# Patient Record
Sex: MALE | Race: BLACK OR AFRICAN AMERICAN | Employment: UNEMPLOYED | URBAN - METROPOLITAN AREA
[De-identification: names, ages, dates, MRNs, and addresses within clinical notes are randomized per-mention and may not be internally consistent; named-entity substitution may affect disease eponyms.]

---

## 2019-01-26 ENCOUNTER — HOSPITAL ENCOUNTER (INPATIENT)
Age: 44
LOS: 6 days | Discharge: HOME | DRG: 174 | End: 2019-02-01
Attending: EMERGENCY MEDICINE | Admitting: INTERNAL MEDICINE
Payer: MEDICAID

## 2019-01-26 ENCOUNTER — HOSPITAL ENCOUNTER (EMERGENCY)
Age: 44
Discharge: OTHER INSTITUTION | End: 2019-01-26
Attending: EMERGENCY MEDICINE
Payer: MEDICAID

## 2019-01-26 VITALS
RESPIRATION RATE: 16 BRPM | TEMPERATURE: 98.6 F | HEIGHT: 74 IN | BODY MASS INDEX: 37.28 KG/M2 | DIASTOLIC BLOOD PRESSURE: 133 MMHG | SYSTOLIC BLOOD PRESSURE: 193 MMHG | OXYGEN SATURATION: 100 % | WEIGHT: 290.5 LBS | HEART RATE: 103 BPM

## 2019-01-26 DIAGNOSIS — I50.9 CHF (CONGESTIVE HEART FAILURE) (HCC): ICD-10-CM

## 2019-01-26 DIAGNOSIS — I25.10 CAD (CORONARY ARTERY DISEASE): ICD-10-CM

## 2019-01-26 DIAGNOSIS — I21.9 MYOCARDIAL INFARCTION (HCC): ICD-10-CM

## 2019-01-26 DIAGNOSIS — I21.19 ACUTE ST ELEVATION MYOCARDIAL INFARCTION (STEMI) OF INFERIOR WALL (HCC): Primary | ICD-10-CM

## 2019-01-26 DIAGNOSIS — I21.19 ACUTE ST ELEVATION MYOCARDIAL INFARCTION (STEMI) INVOLVING OTHER CORONARY ARTERY OF INFERIOR WALL (HCC): Primary | ICD-10-CM

## 2019-01-26 LAB
ALBUMIN SERPL-MCNC: 3.4 G/DL (ref 3.5–5)
ALBUMIN/GLOB SERPL: 0.8 {RATIO} (ref 1.1–2.2)
ALP SERPL-CCNC: 77 U/L (ref 45–117)
ALT SERPL-CCNC: 29 U/L (ref 12–78)
ANION GAP SERPL CALC-SCNC: 9 MMOL/L (ref 5–15)
AST SERPL-CCNC: 77 U/L (ref 15–37)
BASOPHILS # BLD: 0 K/UL (ref 0–0.1)
BASOPHILS NFR BLD: 1 % (ref 0–1)
BILIRUB SERPL-MCNC: 0.3 MG/DL (ref 0.2–1)
BUN SERPL-MCNC: 12 MG/DL (ref 6–20)
BUN/CREAT SERPL: 10 (ref 12–20)
CALCIUM SERPL-MCNC: 8.7 MG/DL (ref 8.5–10.1)
CHLORIDE SERPL-SCNC: 101 MMOL/L (ref 97–108)
CK MB CFR SERPL CALC: 5.4 % (ref 0–2.5)
CK MB SERPL-MCNC: 25.5 NG/ML (ref 5–25)
CK SERPL-CCNC: 471 U/L (ref 39–308)
CO2 SERPL-SCNC: 31 MMOL/L (ref 21–32)
CREAT SERPL-MCNC: 1.17 MG/DL (ref 0.7–1.3)
DIFFERENTIAL METHOD BLD: ABNORMAL
EOSINOPHIL # BLD: 0.1 K/UL (ref 0–0.4)
EOSINOPHIL NFR BLD: 2 % (ref 0–7)
ERYTHROCYTE [DISTWIDTH] IN BLOOD BY AUTOMATED COUNT: 13.6 % (ref 11.5–14.5)
GLOBULIN SER CALC-MCNC: 4.5 G/DL (ref 2–4)
GLUCOSE SERPL-MCNC: 114 MG/DL (ref 65–100)
HCT VFR BLD AUTO: 39.9 % (ref 36.6–50.3)
HGB BLD-MCNC: 12.5 G/DL (ref 12.1–17)
IMM GRANULOCYTES # BLD AUTO: 0 K/UL (ref 0–0.04)
IMM GRANULOCYTES NFR BLD AUTO: 0 % (ref 0–0.5)
LYMPHOCYTES # BLD: 2.3 K/UL (ref 0.8–3.5)
LYMPHOCYTES NFR BLD: 26 % (ref 12–49)
MCH RBC QN AUTO: 25.8 PG (ref 26–34)
MCHC RBC AUTO-ENTMCNC: 31.3 G/DL (ref 30–36.5)
MCV RBC AUTO: 82.3 FL (ref 80–99)
MONOCYTES # BLD: 0.6 K/UL (ref 0–1)
MONOCYTES NFR BLD: 6 % (ref 5–13)
NEUTS SEG # BLD: 5.7 K/UL (ref 1.8–8)
NEUTS SEG NFR BLD: 65 % (ref 32–75)
NRBC # BLD: 0 K/UL (ref 0–0.01)
NRBC BLD-RTO: 0 PER 100 WBC
PLATELET # BLD AUTO: 236 K/UL (ref 150–400)
PMV BLD AUTO: 9.6 FL (ref 8.9–12.9)
POTASSIUM SERPL-SCNC: 3.3 MMOL/L (ref 3.5–5.1)
PROT SERPL-MCNC: 7.9 G/DL (ref 6.4–8.2)
RBC # BLD AUTO: 4.85 M/UL (ref 4.1–5.7)
SODIUM SERPL-SCNC: 141 MMOL/L (ref 136–145)
TROPONIN I BLD-MCNC: 4.09 NG/ML (ref 0–0.08)
TROPONIN I SERPL-MCNC: 8.73 NG/ML
WBC # BLD AUTO: 8.8 K/UL (ref 4.1–11.1)

## 2019-01-26 PROCEDURE — 4A023N7 MEASUREMENT OF CARDIAC SAMPLING AND PRESSURE, LEFT HEART, PERCUTANEOUS APPROACH: ICD-10-PCS | Performed by: INTERNAL MEDICINE

## 2019-01-26 PROCEDURE — C1725 CATH, TRANSLUMIN NON-LASER: HCPCS

## 2019-01-26 PROCEDURE — 85025 COMPLETE CBC W/AUTO DIFF WBC: CPT

## 2019-01-26 PROCEDURE — C1894 INTRO/SHEATH, NON-LASER: HCPCS

## 2019-01-26 PROCEDURE — 80053 COMPREHEN METABOLIC PANEL: CPT

## 2019-01-26 PROCEDURE — 74011000258 HC RX REV CODE- 258: Performed by: INTERNAL MEDICINE

## 2019-01-26 PROCEDURE — 36415 COLL VENOUS BLD VENIPUNCTURE: CPT

## 2019-01-26 PROCEDURE — 74011636320 HC RX REV CODE- 636/320

## 2019-01-26 PROCEDURE — 74011250636 HC RX REV CODE- 250/636: Performed by: NURSE PRACTITIONER

## 2019-01-26 PROCEDURE — C1760 CLOSURE DEV, VASC: HCPCS

## 2019-01-26 PROCEDURE — 74011000250 HC RX REV CODE- 250

## 2019-01-26 PROCEDURE — C1769 GUIDE WIRE: HCPCS

## 2019-01-26 PROCEDURE — 93005 ELECTROCARDIOGRAM TRACING: CPT

## 2019-01-26 PROCEDURE — 65660000000 HC RM CCU STEPDOWN

## 2019-01-26 PROCEDURE — 02703ZZ DILATION OF CORONARY ARTERY, ONE ARTERY, PERCUTANEOUS APPROACH: ICD-10-PCS | Performed by: INTERNAL MEDICINE

## 2019-01-26 PROCEDURE — 99283 EMERGENCY DEPT VISIT LOW MDM: CPT

## 2019-01-26 PROCEDURE — C1887 CATHETER, GUIDING: HCPCS

## 2019-01-26 PROCEDURE — C1874 STENT, COATED/COV W/DEL SYS: HCPCS

## 2019-01-26 PROCEDURE — 74011250637 HC RX REV CODE- 250/637: Performed by: INTERNAL MEDICINE

## 2019-01-26 PROCEDURE — 74011250637 HC RX REV CODE- 250/637: Performed by: EMERGENCY MEDICINE

## 2019-01-26 PROCEDURE — 77030003623 HC NDL PERC VASC TELE -C

## 2019-01-26 PROCEDURE — B2111ZZ FLUOROSCOPY OF MULTIPLE CORONARY ARTERIES USING LOW OSMOLAR CONTRAST: ICD-10-PCS | Performed by: INTERNAL MEDICINE

## 2019-01-26 PROCEDURE — 82550 ASSAY OF CK (CPK): CPT

## 2019-01-26 PROCEDURE — B2151ZZ FLUOROSCOPY OF LEFT HEART USING LOW OSMOLAR CONTRAST: ICD-10-PCS | Performed by: INTERNAL MEDICINE

## 2019-01-26 PROCEDURE — 96374 THER/PROPH/DIAG INJ IV PUSH: CPT

## 2019-01-26 PROCEDURE — 74011636320 HC RX REV CODE- 636/320: Performed by: INTERNAL MEDICINE

## 2019-01-26 PROCEDURE — 74011250636 HC RX REV CODE- 250/636

## 2019-01-26 PROCEDURE — 84484 ASSAY OF TROPONIN QUANT: CPT

## 2019-01-26 PROCEDURE — 74011000250 HC RX REV CODE- 250: Performed by: INTERNAL MEDICINE

## 2019-01-26 PROCEDURE — 77030004550 HC CATH ANGI DX PRF MRTM -B

## 2019-01-26 PROCEDURE — 74011250636 HC RX REV CODE- 250/636: Performed by: INTERNAL MEDICINE

## 2019-01-26 PROCEDURE — 77030028837 HC SYR ANGI PWR INJ COEU -A

## 2019-01-26 PROCEDURE — 77030019697 HC SYR ANGI INFL MRTM -B

## 2019-01-26 PROCEDURE — 027034Z DILATION OF CORONARY ARTERY, ONE ARTERY WITH DRUG-ELUTING INTRALUMINAL DEVICE, PERCUTANEOUS APPROACH: ICD-10-PCS | Performed by: INTERNAL MEDICINE

## 2019-01-26 PROCEDURE — 99285 EMERGENCY DEPT VISIT HI MDM: CPT

## 2019-01-26 PROCEDURE — 74011250637 HC RX REV CODE- 250/637

## 2019-01-26 PROCEDURE — 77030022017 HC DRSG HEMO QCLOT ZMED -A

## 2019-01-26 PROCEDURE — 93458 L HRT ARTERY/VENTRICLE ANGIO: CPT

## 2019-01-26 RX ORDER — SODIUM CHLORIDE 9 MG/ML
100 INJECTION, SOLUTION INTRAVENOUS CONTINUOUS
Status: DISPENSED | OUTPATIENT
Start: 2019-01-26 | End: 2019-01-26

## 2019-01-26 RX ORDER — NITROGLYCERIN 20 MG/100ML
0-100 INJECTION INTRAVENOUS
Status: DISCONTINUED | OUTPATIENT
Start: 2019-01-26 | End: 2019-02-01 | Stop reason: HOSPADM

## 2019-01-26 RX ORDER — HEPARIN SODIUM 200 [USP'U]/100ML
500 INJECTION, SOLUTION INTRAVENOUS ONCE
Status: COMPLETED | OUTPATIENT
Start: 2019-01-26 | End: 2019-01-26

## 2019-01-26 RX ORDER — SODIUM CHLORIDE 0.9 % (FLUSH) 0.9 %
5-40 SYRINGE (ML) INJECTION AS NEEDED
Status: DISCONTINUED | OUTPATIENT
Start: 2019-01-26 | End: 2019-02-01 | Stop reason: HOSPADM

## 2019-01-26 RX ORDER — MORPHINE SULFATE 2 MG/ML
2 INJECTION, SOLUTION INTRAMUSCULAR; INTRAVENOUS
Status: DISCONTINUED | OUTPATIENT
Start: 2019-01-26 | End: 2019-01-26 | Stop reason: CLARIF

## 2019-01-26 RX ORDER — HEPARIN SODIUM 200 [USP'U]/100ML
INJECTION, SOLUTION INTRAVENOUS
Status: COMPLETED
Start: 2019-01-26 | End: 2019-01-26

## 2019-01-26 RX ORDER — ENOXAPARIN SODIUM 100 MG/ML
40 INJECTION SUBCUTANEOUS EVERY 24 HOURS
Status: DISCONTINUED | OUTPATIENT
Start: 2019-01-26 | End: 2019-02-01 | Stop reason: HOSPADM

## 2019-01-26 RX ORDER — FENTANYL CITRATE 50 UG/ML
25-50 INJECTION, SOLUTION INTRAMUSCULAR; INTRAVENOUS
Status: DISCONTINUED | OUTPATIENT
Start: 2019-01-26 | End: 2019-01-26 | Stop reason: HOSPADM

## 2019-01-26 RX ORDER — NITROGLYCERIN 0.4 MG/1
0.4 TABLET SUBLINGUAL
Status: DISCONTINUED | OUTPATIENT
Start: 2019-01-26 | End: 2019-02-01 | Stop reason: HOSPADM

## 2019-01-26 RX ORDER — SODIUM CHLORIDE 0.9 % (FLUSH) 0.9 %
5-40 SYRINGE (ML) INJECTION EVERY 8 HOURS
Status: DISCONTINUED | OUTPATIENT
Start: 2019-01-26 | End: 2019-02-01 | Stop reason: HOSPADM

## 2019-01-26 RX ORDER — SODIUM CHLORIDE 900 MG/100ML
INJECTION INTRAVENOUS
Status: DISCONTINUED
Start: 2019-01-26 | End: 2019-02-01 | Stop reason: HOSPADM

## 2019-01-26 RX ORDER — BIVALIRUDIN 250 MG/5ML
INJECTION, POWDER, LYOPHILIZED, FOR SOLUTION INTRAVENOUS
Status: DISCONTINUED
Start: 2019-01-26 | End: 2019-02-01 | Stop reason: HOSPADM

## 2019-01-26 RX ORDER — ACETAMINOPHEN 325 MG/1
650 TABLET ORAL
Status: DISCONTINUED | OUTPATIENT
Start: 2019-01-26 | End: 2019-02-01 | Stop reason: HOSPADM

## 2019-01-26 RX ORDER — HEPARIN SODIUM 1000 [USP'U]/ML
INJECTION, SOLUTION INTRAVENOUS; SUBCUTANEOUS
Status: DISCONTINUED
Start: 2019-01-26 | End: 2019-02-01 | Stop reason: HOSPADM

## 2019-01-26 RX ORDER — GUAIFENESIN 100 MG/5ML
81 LIQUID (ML) ORAL DAILY
Status: DISCONTINUED | OUTPATIENT
Start: 2019-01-27 | End: 2019-02-01 | Stop reason: HOSPADM

## 2019-01-26 RX ORDER — LISINOPRIL 5 MG/1
2.5 TABLET ORAL ONCE
Status: COMPLETED | OUTPATIENT
Start: 2019-01-26 | End: 2019-01-26

## 2019-01-26 RX ORDER — MIDAZOLAM HYDROCHLORIDE 1 MG/ML
.5-2 INJECTION, SOLUTION INTRAMUSCULAR; INTRAVENOUS
Status: DISCONTINUED | OUTPATIENT
Start: 2019-01-26 | End: 2019-01-26 | Stop reason: HOSPADM

## 2019-01-26 RX ORDER — FENTANYL CITRATE 50 UG/ML
INJECTION, SOLUTION INTRAMUSCULAR; INTRAVENOUS
Status: COMPLETED
Start: 2019-01-26 | End: 2019-01-26

## 2019-01-26 RX ORDER — MORPHINE SULFATE 2 MG/ML
2 INJECTION, SOLUTION INTRAMUSCULAR; INTRAVENOUS
Status: DISCONTINUED | OUTPATIENT
Start: 2019-01-26 | End: 2019-02-01 | Stop reason: HOSPADM

## 2019-01-26 RX ORDER — METOPROLOL TARTRATE 5 MG/5ML
INJECTION INTRAVENOUS
Status: COMPLETED
Start: 2019-01-26 | End: 2019-01-26

## 2019-01-26 RX ORDER — CLOPIDOGREL 300 MG/1
600 TABLET, FILM COATED ORAL ONCE
Status: COMPLETED | OUTPATIENT
Start: 2019-01-26 | End: 2019-01-26

## 2019-01-26 RX ORDER — SPIRONOLACTONE 25 MG/1
25 TABLET ORAL DAILY
Status: DISCONTINUED | OUTPATIENT
Start: 2019-01-27 | End: 2019-02-01 | Stop reason: HOSPADM

## 2019-01-26 RX ORDER — MIDAZOLAM HYDROCHLORIDE 1 MG/ML
INJECTION, SOLUTION INTRAMUSCULAR; INTRAVENOUS
Status: COMPLETED
Start: 2019-01-26 | End: 2019-01-26

## 2019-01-26 RX ORDER — CLONIDINE HYDROCHLORIDE 0.1 MG/1
0.1 TABLET ORAL 2 TIMES DAILY
Status: DISCONTINUED | OUTPATIENT
Start: 2019-01-26 | End: 2019-01-28

## 2019-01-26 RX ORDER — CLOPIDOGREL 300 MG/1
TABLET, FILM COATED ORAL
Status: COMPLETED
Start: 2019-01-26 | End: 2019-01-26

## 2019-01-26 RX ORDER — LIDOCAINE HYDROCHLORIDE 10 MG/ML
INJECTION, SOLUTION EPIDURAL; INFILTRATION; INTRACAUDAL; PERINEURAL
Status: COMPLETED
Start: 2019-01-26 | End: 2019-01-26

## 2019-01-26 RX ORDER — HEPARIN SODIUM 5000 [USP'U]/ML
5000 INJECTION, SOLUTION INTRAVENOUS; SUBCUTANEOUS ONCE
Status: COMPLETED | OUTPATIENT
Start: 2019-01-26 | End: 2019-01-26

## 2019-01-26 RX ORDER — MIDAZOLAM HYDROCHLORIDE 1 MG/ML
INJECTION, SOLUTION INTRAMUSCULAR; INTRAVENOUS
Status: DISCONTINUED
Start: 2019-01-26 | End: 2019-02-01 | Stop reason: HOSPADM

## 2019-01-26 RX ORDER — LISINOPRIL 5 MG/1
2.5 TABLET ORAL DAILY
Status: DISCONTINUED | OUTPATIENT
Start: 2019-01-27 | End: 2019-01-27

## 2019-01-26 RX ORDER — LIDOCAINE HYDROCHLORIDE 10 MG/ML
1-30 INJECTION, SOLUTION EPIDURAL; INFILTRATION; INTRACAUDAL; PERINEURAL
Status: DISCONTINUED | OUTPATIENT
Start: 2019-01-26 | End: 2019-01-26 | Stop reason: HOSPADM

## 2019-01-26 RX ORDER — GUAIFENESIN 100 MG/5ML
324 LIQUID (ML) ORAL
Status: COMPLETED | OUTPATIENT
Start: 2019-01-26 | End: 2019-01-26

## 2019-01-26 RX ORDER — METOPROLOL TARTRATE 5 MG/5ML
5 INJECTION INTRAVENOUS ONCE
Status: COMPLETED | OUTPATIENT
Start: 2019-01-26 | End: 2019-01-26

## 2019-01-26 RX ORDER — ATORVASTATIN CALCIUM 40 MG/1
40 TABLET, FILM COATED ORAL
Status: DISCONTINUED | OUTPATIENT
Start: 2019-01-26 | End: 2019-02-01 | Stop reason: HOSPADM

## 2019-01-26 RX ORDER — METOPROLOL TARTRATE 25 MG/1
25 TABLET, FILM COATED ORAL 2 TIMES DAILY
Status: DISCONTINUED | OUTPATIENT
Start: 2019-01-26 | End: 2019-01-27

## 2019-01-26 RX ADMIN — HEPARIN SODIUM 5000 UNITS: 5000 INJECTION INTRAVENOUS; SUBCUTANEOUS at 13:53

## 2019-01-26 RX ADMIN — FENTANYL CITRATE 50 MCG: 50 INJECTION, SOLUTION INTRAMUSCULAR; INTRAVENOUS at 14:41

## 2019-01-26 RX ADMIN — HEPARIN SODIUM 1000 UNITS: 200 INJECTION, SOLUTION INTRAVENOUS at 15:09

## 2019-01-26 RX ADMIN — FENTANYL CITRATE 25 MCG: 50 INJECTION, SOLUTION INTRAMUSCULAR; INTRAVENOUS at 15:23

## 2019-01-26 RX ADMIN — FENTANYL CITRATE 50 MCG: 50 INJECTION, SOLUTION INTRAMUSCULAR; INTRAVENOUS at 14:49

## 2019-01-26 RX ADMIN — MORPHINE SULFATE 2 MG: 2 INJECTION, SOLUTION INTRAMUSCULAR; INTRAVENOUS at 20:38

## 2019-01-26 RX ADMIN — LISINOPRIL 2.5 MG: 5 TABLET ORAL at 17:34

## 2019-01-26 RX ADMIN — FENTANYL CITRATE 25 MCG: 50 INJECTION, SOLUTION INTRAMUSCULAR; INTRAVENOUS at 15:39

## 2019-01-26 RX ADMIN — ASPIRIN 81 MG 324 MG: 81 TABLET ORAL at 13:36

## 2019-01-26 RX ADMIN — IOPAMIDOL 30 ML: 755 INJECTION, SOLUTION INTRAVENOUS at 15:21

## 2019-01-26 RX ADMIN — Medication 10 ML: at 21:55

## 2019-01-26 RX ADMIN — MIDAZOLAM HYDROCHLORIDE 2 MG: 1 INJECTION, SOLUTION INTRAMUSCULAR; INTRAVENOUS at 16:03

## 2019-01-26 RX ADMIN — CLONIDINE HYDROCHLORIDE 0.1 MG: 0.1 TABLET ORAL at 16:52

## 2019-01-26 RX ADMIN — MIDAZOLAM HYDROCHLORIDE 1 MG: 1 INJECTION, SOLUTION INTRAMUSCULAR; INTRAVENOUS at 15:24

## 2019-01-26 RX ADMIN — CLOPIDOGREL BISULFATE 600 MG: 300 TABLET, FILM COATED ORAL at 16:09

## 2019-01-26 RX ADMIN — MIDAZOLAM HYDROCHLORIDE 2 MG: 1 INJECTION, SOLUTION INTRAMUSCULAR; INTRAVENOUS at 14:49

## 2019-01-26 RX ADMIN — METOPROLOL TARTRATE 5 MG: 5 INJECTION INTRAVENOUS at 14:51

## 2019-01-26 RX ADMIN — IOPAMIDOL 120 ML: 755 INJECTION, SOLUTION INTRAVENOUS at 15:10

## 2019-01-26 RX ADMIN — IOPAMIDOL 135 ML: 755 INJECTION, SOLUTION INTRAVENOUS at 15:49

## 2019-01-26 RX ADMIN — METOPROLOL TARTRATE 25 MG: 25 TABLET ORAL at 17:35

## 2019-01-26 RX ADMIN — MIDAZOLAM HYDROCHLORIDE 1 MG: 1 INJECTION, SOLUTION INTRAMUSCULAR; INTRAVENOUS at 15:07

## 2019-01-26 RX ADMIN — FENTANYL CITRATE 25 MCG: 50 INJECTION, SOLUTION INTRAMUSCULAR; INTRAVENOUS at 15:01

## 2019-01-26 RX ADMIN — NITROGLYCERIN 30 MCG/MIN: 20 INJECTION INTRAVENOUS at 18:32

## 2019-01-26 RX ADMIN — BIVALIRUDIN 1.75 MG/KG/HR: 250 INJECTION, POWDER, LYOPHILIZED, FOR SOLUTION INTRAVENOUS at 14:56

## 2019-01-26 RX ADMIN — MIDAZOLAM HYDROCHLORIDE 1 MG: 1 INJECTION, SOLUTION INTRAMUSCULAR; INTRAVENOUS at 15:23

## 2019-01-26 RX ADMIN — LIDOCAINE HYDROCHLORIDE 30 ML: 10 INJECTION, SOLUTION EPIDURAL; INFILTRATION; INTRACAUDAL; PERINEURAL at 14:43

## 2019-01-26 RX ADMIN — MIDAZOLAM HYDROCHLORIDE 1 MG: 1 INJECTION, SOLUTION INTRAMUSCULAR; INTRAVENOUS at 15:27

## 2019-01-26 RX ADMIN — METOPROLOL TARTRATE 5 MG: 5 INJECTION, SOLUTION INTRAVENOUS at 14:51

## 2019-01-26 RX ADMIN — ATORVASTATIN CALCIUM 40 MG: 40 TABLET, FILM COATED ORAL at 21:55

## 2019-01-26 RX ADMIN — MIDAZOLAM HYDROCHLORIDE 2 MG: 1 INJECTION, SOLUTION INTRAMUSCULAR; INTRAVENOUS at 14:41

## 2019-01-26 RX ADMIN — MIDAZOLAM HYDROCHLORIDE 2 MG: 1 INJECTION, SOLUTION INTRAMUSCULAR; INTRAVENOUS at 15:01

## 2019-01-26 RX ADMIN — BIVALIRUDIN 1.75 MG/KG/HR: 250 INJECTION, POWDER, LYOPHILIZED, FOR SOLUTION INTRAVENOUS at 15:16

## 2019-01-26 RX ADMIN — FENTANYL CITRATE 25 MCG: 50 INJECTION, SOLUTION INTRAMUSCULAR; INTRAVENOUS at 15:27

## 2019-01-26 RX ADMIN — CLOPIDOGREL 600 MG: 300 TABLET, FILM COATED ORAL at 16:09

## 2019-01-26 RX ADMIN — Medication 10 ML: at 20:38

## 2019-01-26 RX ADMIN — IOPAMIDOL 50 ML: 755 INJECTION, SOLUTION INTRAVENOUS at 16:08

## 2019-01-26 NOTE — Clinical Note
TRANSFER - OUT REPORT:  
 
Verbal report given to: Roscoe Cruz RN. Report consisted of patient's Situation, Background, Assessment and  
Recommendations(SBAR). Opportunity for questions and clarification was provided. Patient transported with a Registered Nurse.

## 2019-01-26 NOTE — ED NOTES
Pt reports hx of HTN, denies any other PMH. Pt reports taking 5 HTN meds, reports he is unsure of the names of his medications.

## 2019-01-26 NOTE — ED PROVIDER NOTES
EMERGENCY DEPARTMENT HISTORY AND PHYSICAL EXAM 
 
Date: 2019 Patient Name: Lida Ordoñez History of Presenting Illness Chief Complaint Patient presents with  Chest Pain STEMI History Provided By: Patient Chief Complaint: Intermittent chest pain Duration:  2 Days Timing:  Acute Location: upper chest left and right side Quality: Aching and Tightness Severity: 8 out of 10 Modifying Factors: states if he holds his breath pain lessens Associated Symptoms: denies any other associated signs or symptoms HPI: Lida Ordoñez is a 37 y.o. male with a PMH of hypertension( has not taken clonidine for 2 days no current PCP) who presents with intermittent chest pain onset 2 days ago. Pain across chest wall upper chest . Non radiating. Intermittent 8/10 .+smoker. brother  of MI one year ago. Grandmother had several MIs Patient specifically denies fever fatigue chest pain shortness of breath abdominal pain nausea vomiitng diarrhea dysuria numbness headache Patient specifically denies fever fatigue  shortness of breath abdominal pain nausea vomiitng diarrhea dysuria numbness headache. He denies previous cardiac work up. PCP: None Past History Past Medical History: No past medical history on file. Past Surgical History: No past surgical history on file. Family History: No family history on file. Social History: 
Social History Tobacco Use  Smoking status: Not on file Substance Use Topics  Alcohol use: Not on file  Drug use: Not on file Allergies: 
No Known Allergies Review of Systems Review of Systems Constitutional: Negative for chills, fatigue and fever. HENT: Negative for congestion and sore throat. Eyes: Negative for redness. Respiratory: Negative for cough, chest tightness and wheezing. Cardiovascular: Positive for chest pain. Gastrointestinal: Negative for abdominal pain. Genitourinary: Negative for dysuria. Musculoskeletal: Negative for arthralgias, back pain, myalgias, neck pain and neck stiffness. Skin: Negative for rash. Neurological: Negative for dizziness, syncope, weakness, light-headedness, numbness and headaches. Hematological: Negative for adenopathy. All other systems reviewed and are negative. Physical Exam  
 
Vitals:  
 01/26/19 1322 01/26/19 1340 01/26/19 1341 01/26/19 1354 BP: (!) 193/133 Pulse: 99 (!) 105 (!) 107 (!) 103 Resp: 18 20 14 16 Temp: 98.5 °F (36.9 °C)   98.6 °F (37 °C) SpO2: 100% 98% 100% Weight: 131.8 kg (290 lb 8 oz) Height: 6' 2\" (1.88 m) Physical Exam  
Constitutional: He is oriented to person, place, and time. He appears well-developed and well-nourished. HENT:  
Head: Normocephalic and atraumatic. Right Ear: External ear normal.  
Mouth/Throat: Oropharynx is clear and moist.  
Eyes: Conjunctivae are normal. Right eye exhibits no discharge. Left eye exhibits no discharge. Neck: Normal range of motion. Neck supple. Cardiovascular: Normal rate, regular rhythm and normal heart sounds. Pulmonary/Chest: Effort normal and breath sounds normal. No respiratory distress. He has no wheezes. Abdominal: Soft. Bowel sounds are normal. There is no tenderness. Musculoskeletal: Normal range of motion. He exhibits no edema. Lymphadenopathy:  
  He has no cervical adenopathy. Neurological: He is alert and oriented to person, place, and time. No cranial nerve deficit. Skin: Skin is warm and dry. diaphoretic Psychiatric: He has a normal mood and affect. His behavior is normal. Judgment and thought content normal.  
Nursing note and vitals reviewed. 1:45 PM 
Jarred Cortes MD spoke with Dr. Vincent Latif, ED Physician at [address] [facility]. Discussed HPI and PE, available diagnostic tests and clinical findings. He is in agreement with care plans as outlined. He accepts transfer and will discuss with Cardiology. Transfer Note: 
Patient is being transferred to ED [facility] ED , transfer accepted by Dr Araceli Hathaway. The reasons for their transfer have been discussed with them and available family. They convey agreement and understanding for the need to be transferred as explained to them by Dr Tarah Augustin Diagnostic Study Results ED EKG interpretation: 
Rhythm: normal sinus rhythm; and regular . Rate (approx.): 100; Axis: normal; P wave: normal; QRS interval: normal ; ST/T wave: elevated ; in  Lead: II , III  and aVF ; Other findings: abnormal ekg acute MI. This EKG was interpreted by Joycelyn Odom NP,ED Provider. Labs - Recent Results (from the past 12 hour(s)) EKG, 12 LEAD, INITIAL Collection Time: 01/26/19  1:26 PM  
Result Value Ref Range Ventricular Rate 100 BPM  
 Atrial Rate 100 BPM  
 P-R Interval 182 ms QRS Duration 96 ms  
 Q-T Interval 376 ms QTC Calculation (Bezet) 485 ms Calculated P Axis 80 degrees Calculated R Axis 41 degrees Calculated T Axis 86 degrees Diagnosis Normal sinus rhythm Inferior-posterior infarct , possibly acute Cannot rule out Anterior infarct , age undetermined ** ** ACUTE MI / STEMI ** ** 
Consider right ventricular involvement in acute inferior infarct Abnormal ECG No previous ECGs available Radiologic Studies - No orders to display CT Results  (Last 48 hours) None CXR Results  (Last 48 hours) None Medical Decision Making I am the first provider for this patient. I reviewed the vital signs, available nursing notes, past medical history, past surgical history, family history and social history. Vital Signs-Reviewed the patient's vital signs. Records Reviewed: Nursing Notes Patient Vitals for the past 12 hrs: 
 Temp Pulse Resp BP SpO2  
01/26/19 1354 98.6 °F (37 °C) (!) 103 16    
01/26/19 1341  (!) 107 14  100 % 01/26/19 1340  (!) 105 20  98 % 01/26/19 1322 98.5 °F (36.9 °C) 99 18 (!) 193/133 100 % Provider Notes (Medical Decision Making): DDX ACS MI PE HTN  Pericarditis Aortic dissection Patient presents as chest pain , 
Progress note: 
Aspirin 324mg given. Procedure Note- Peripheral IV Access 1:56 PM 
Performed by: MD Vera Aceves MD gained IV access using a 20 gauge needle because the patient had no vascular access. After cleaning the site with alcohol prep, the Right Basilic vein was localized with ultrasound guidance in an anterior approach. Line confirmation was obtained by direct visualization and good blood return. No anaesthetic was used. The line was successfully flushed with normal saline and was secured with transparent tape. Estimated blood loss: 0mL The procedure took 15 minutes, and pt tolerated well. 
 
1:54 PM Denies pain. Heparin bolus administerd oxygen at 2lpm nc 
 
Procedures: 
Procedures CRITICAL CARE NOTE : 
 
2:01 PM 
 
IMPENDING DETERIORATION -Cardiovascular ASSOCIATED RISK FACTORS - Shock and Dysrhythmia MANAGEMENT- Supervision of Care and Transfer INTERPRETATION -  ECG and Blood Pressure INTERVENTIONS - MI protocol monitor medications CASE REVIEW -ER physician TREATMENT RESPONSE -Improved PERFORMED BY - Mid-Level Poornima Hunter) and Physician Jem Carrasco) NOTES   : 
 
I have spent 55 minutes of critical care time involved in lab review, consultations with specialist, family decision- making, bedside attention and documentation. During this entire length of time I was immediately available to the patient. Critical Care: The reason for providing this level of medical care for this critically ill patient was due to a critical illness that impaired one or more vital organ systems such that there was a high probability of imminent or life threatening deterioration in the patients condition.  This care involved high complexity decision making to assess, manipulate, and support vital system functions. Netta Lopez NP Diagnosis Clinical Impression: 1. Acute ST elevation myocardial infarction (STEMI) of inferior wall (HCC)   
 
1:58 PM 
I have personally seen and examined the patient, reviewed the CHRISTY's note and agree with findings and plan. Rosie Rodney MD 
  
The patient presents with: Intermittent chest pain for 2 days, strong family history My exam shows: Overweight, diaphoretic, RRR, Lungs clear Impression/Plan:  EKG shows inferior STEMI, will transfer to [facility] for emergent Cath. Discussed with Dr. Arminda Mcbride who accepts. I have reviewed and agree with the MLP's findings, including all diagnostic interpretations, and plans as written. I was present during the key portions of separately billed procedures.   
Rosie Rodney MD

## 2019-01-26 NOTE — H&P
Full H and P to be dictated. Pt presents STEMI, PCI of ramus done. 3 vessel CAD with LV dysfunction.

## 2019-01-26 NOTE — ED PROVIDER NOTES
2:18 PM 
 
Bari Christianson is a 37 y.o. male with PMHx significant for HTN, who presents via EMS to ED with cc of an intermittent chest pain x 2 days, turning constant today. He denies any associated symptoms. Pt was seen at [facility] today and found to be a STEMI and was emergently transferred here for a cardiac catheterization. He notes since onset, his pain has improved. He endorses having a FHx of cardiac disease. Pt also confirms he is a tobacco user. Pt denies any SOB or cocaine use. PMHx is significant for: HTN 
PSHx is significant for: none Social Hx: + Tobacco 
 
PCP: None There are no other sxs or complaints noted at this time. ROS: 
1) +CP 
2) -SOB All other Systems Negative No data found. PE: 
General appearance - well nourished, well appearing, and in no distress Eyes - pupils equal and reactive, extraocular eye movements intact ENT - mucous membranes moist, pharynx normal without lesions Neck - supple, no significant adenopathy;  
Chest - clear to auscultation, no wheezes, rales or rhonchi; non-tender to palpation Heart - normal rate and regular rhythm, S1 and S2 normal, no murmurs noted Abdomen - soft, nontender, nondistended Musculoskeletal - no joint tenderness or deformity; mild BLE edema, normal ROM Extremities - peripheral pulses normal, no pedal edema Skin - normal coloration and turgor, no rashes Neurological - alert, oriented x3, normal speech, no focal findings or movement disorder noted ED COURSE Zelda SERNA 
1975 Time EMS pre-alert: 2741 Time STEMI called: 6940 Time arrived on Unit: 1418, No att. providers found in room Time cardiology/cath lab paged: [phone] Time cardiologist returned call: [phone] Time Cath Lab returned call:  3024 Time Cardiologist arrived on Unit: 1416 Time Cath Lab arrived on Unit: Pt was brought up to cath lab by EMS.  
 
CONSULT NOTE:  
1:44 PM 
Arturo Pena DO spoke with Андрей Sheffield MD, [facility],  
 Specialty: Cardiology Discussed pt's hx, disposition, and available diagnostic and imaging results. Reviewed care plans. Consultant agrees with plans as outlined. Consultant will evaluate and admit the pt. Mark Johns Written by Gerardo Murrieta ED Scribe, as dictated by Octaviano Almanzar DO. Critical Care: CRITICAL CARE NOTE : 
 
2:29 PM 
 
IMPENDING DETERIORATION -Cardiovascular ASSOCIATED RISK FACTORS - Vascular Compromise MANAGEMENT- Bedside Assessment and Supervision of Care INTERPRETATION -  Xrays, ECG and Blood Pressure INTERVENTIONS - cardiology consultation, quick transfer to the cardiac cath lab CASE REVIEW - Medical Sub-Specialist, Nursing and Family TREATMENT RESPONSE -Improved PERFORMED BY - Self NOTES   : 
 
I have spent 35 minutes of critical care time involved in lab review, consultations with specialist, family decision- making, bedside attention and documentation. During this entire length of time I was immediately available to the patient . Octaviano Almanzar DO 
 
PLAN: 
1. Sent to cath lab Attestation: This note is prepared by Gerardo Murrieta, acting as Scribe for Octaviano Almanzar DO. Octaviano Almanzar DO: The scribe's documentation has been prepared under my direction and personally reviewed by me in its entirety. I confirm that the note above accurately reflects all work, treatment, procedures, and medical decision making performed by me. This note will not be viewable in [address]

## 2019-01-26 NOTE — ED NOTES
TRANSFER - OUT REPORT: 
 
Verbal report given to GIL Ndiaye RN(name) on Gonzalez Valencia  being transferred to [facility] ED(unit) for urgent transfer Report consisted of patients Situation, Background, Assessment and  
Recommendations(SBAR). Information from the following report(s) SBAR, ED Summary, [facility] and Recent Results was reviewed with the receiving nurse. Lines:  
Peripheral IV 01/26/19 Right Antecubital (Active) Site Assessment Clean, dry, & intact 1/26/2019  2:02 PM  
Phlebitis Assessment 0 1/26/2019  2:02 PM  
Infiltration Assessment 0 1/26/2019  2:02 PM  
Dressing Status Clean, dry, & intact 1/26/2019  2:02 PM  
Dressing Type Transparent 1/26/2019  2:02 PM  
Hub Color/Line Status Patent 1/26/2019  2:02 PM  
Action Taken Blood drawn 1/26/2019  2:02 PM  
   
Peripheral IV 01/26/19 Left Hand (Active) Site Assessment Clean, dry, & intact 1/26/2019  2:02 PM  
Phlebitis Assessment 0 1/26/2019  2:02 PM  
Infiltration Assessment 0 1/26/2019  2:02 PM  
Dressing Status Clean, dry, & intact 1/26/2019  2:02 PM  
  
 
Opportunity for questions and clarification was provided.    
 
Patient transported with: 
EMS

## 2019-01-26 NOTE — Clinical Note
Mallampati: Class III - soft palate, base of uvula visible. ASA: Class 3 - patient with severe systemic disease.

## 2019-01-26 NOTE — ED NOTES
Pt presents ambulatory to ED complaining of chest pain x2 days and being out of HTN meds x3 days. Pt denies taking any meds for his symptoms. Pt is alert and oriented x 4, RR even , skin is clammy, pt diaphoretic. Assesment completed and pt updated on plan of care. Emergency Department Nursing Plan of Care The Nursing Plan of Care is developed from the Nursing assessment and Emergency Department Attending provider initial evaluation. The plan of care may be reviewed in the ED Provider note. The Plan of Care was developed with the following considerations:  
Patient / Family readiness to learn indicated by:verbalized understanding Persons(s) to be included in education: patient Barriers to Learning/Limitations:No 
 
Signed Jostin Suarez RN   
1/26/2019   2:08 PM

## 2019-01-27 ENCOUNTER — APPOINTMENT (OUTPATIENT)
Dept: GENERAL RADIOLOGY | Age: 44
DRG: 174 | End: 2019-01-27
Attending: INTERNAL MEDICINE
Payer: MEDICAID

## 2019-01-27 LAB
ANION GAP SERPL CALC-SCNC: 6 MMOL/L (ref 5–15)
ATRIAL RATE: 100 BPM
ATRIAL RATE: 87 BPM
ATRIAL RATE: 89 BPM
BUN SERPL-MCNC: 11 MG/DL (ref 6–20)
BUN/CREAT SERPL: 10 (ref 12–20)
CALCIUM SERPL-MCNC: 7.8 MG/DL (ref 8.5–10.1)
CALCULATED P AXIS, ECG09: 46 DEGREES
CALCULATED P AXIS, ECG09: 48 DEGREES
CALCULATED P AXIS, ECG09: 80 DEGREES
CALCULATED R AXIS, ECG10: -14 DEGREES
CALCULATED R AXIS, ECG10: 41 DEGREES
CALCULATED R AXIS, ECG10: 7 DEGREES
CALCULATED T AXIS, ECG11: -67 DEGREES
CALCULATED T AXIS, ECG11: 86 DEGREES
CALCULATED T AXIS, ECG11: 94 DEGREES
CHLORIDE SERPL-SCNC: 104 MMOL/L (ref 97–108)
CHOLEST SERPL-MCNC: 119 MG/DL
CO2 SERPL-SCNC: 28 MMOL/L (ref 21–32)
CREAT SERPL-MCNC: 1.14 MG/DL (ref 0.7–1.3)
DIAGNOSIS, 93000: NORMAL
ERYTHROCYTE [DISTWIDTH] IN BLOOD BY AUTOMATED COUNT: 13.9 % (ref 11.5–14.5)
GLUCOSE SERPL-MCNC: 92 MG/DL (ref 65–100)
HCT VFR BLD AUTO: 34.3 % (ref 36.6–50.3)
HDLC SERPL-MCNC: 36 MG/DL
HDLC SERPL: 3.3 {RATIO} (ref 0–5)
HGB BLD-MCNC: 10.8 G/DL (ref 12.1–17)
LDLC SERPL CALC-MCNC: 64.8 MG/DL (ref 0–100)
LIPID PROFILE,FLP: NORMAL
MCH RBC QN AUTO: 25.4 PG (ref 26–34)
MCHC RBC AUTO-ENTMCNC: 31.5 G/DL (ref 30–36.5)
MCV RBC AUTO: 80.7 FL (ref 80–99)
NRBC # BLD: 0 K/UL (ref 0–0.01)
NRBC BLD-RTO: 0 PER 100 WBC
P-R INTERVAL, ECG05: 152 MS
P-R INTERVAL, ECG05: 172 MS
P-R INTERVAL, ECG05: 182 MS
PLATELET # BLD AUTO: 204 K/UL (ref 150–400)
PMV BLD AUTO: 10.4 FL (ref 8.9–12.9)
POTASSIUM SERPL-SCNC: 3.2 MMOL/L (ref 3.5–5.1)
Q-T INTERVAL, ECG07: 376 MS
Q-T INTERVAL, ECG07: 418 MS
Q-T INTERVAL, ECG07: 434 MS
QRS DURATION, ECG06: 94 MS
QRS DURATION, ECG06: 96 MS
QRS DURATION, ECG06: 98 MS
QTC CALCULATION (BEZET), ECG08: 485 MS
QTC CALCULATION (BEZET), ECG08: 508 MS
QTC CALCULATION (BEZET), ECG08: 522 MS
RBC # BLD AUTO: 4.25 M/UL (ref 4.1–5.7)
SODIUM SERPL-SCNC: 138 MMOL/L (ref 136–145)
TRIGL SERPL-MCNC: 91 MG/DL (ref ?–150)
VENTRICULAR RATE, ECG03: 100 BPM
VENTRICULAR RATE, ECG03: 87 BPM
VENTRICULAR RATE, ECG03: 89 BPM
VLDLC SERPL CALC-MCNC: 18.2 MG/DL
WBC # BLD AUTO: 8.5 K/UL (ref 4.1–11.1)

## 2019-01-27 PROCEDURE — 85027 COMPLETE CBC AUTOMATED: CPT

## 2019-01-27 PROCEDURE — 74011000250 HC RX REV CODE- 250: Performed by: INTERNAL MEDICINE

## 2019-01-27 PROCEDURE — 74011250636 HC RX REV CODE- 250/636: Performed by: INTERNAL MEDICINE

## 2019-01-27 PROCEDURE — 36415 COLL VENOUS BLD VENIPUNCTURE: CPT

## 2019-01-27 PROCEDURE — 80048 BASIC METABOLIC PNL TOTAL CA: CPT

## 2019-01-27 PROCEDURE — 71045 X-RAY EXAM CHEST 1 VIEW: CPT

## 2019-01-27 PROCEDURE — 80061 LIPID PANEL: CPT

## 2019-01-27 PROCEDURE — 93005 ELECTROCARDIOGRAM TRACING: CPT

## 2019-01-27 PROCEDURE — 65660000000 HC RM CCU STEPDOWN

## 2019-01-27 PROCEDURE — 74011250637 HC RX REV CODE- 250/637: Performed by: INTERNAL MEDICINE

## 2019-01-27 PROCEDURE — 94760 N-INVAS EAR/PLS OXIMETRY 1: CPT

## 2019-01-27 RX ORDER — POTASSIUM CHLORIDE 20 MEQ/1
20 TABLET, EXTENDED RELEASE ORAL 2 TIMES DAILY
Status: DISCONTINUED | OUTPATIENT
Start: 2019-01-27 | End: 2019-02-01 | Stop reason: HOSPADM

## 2019-01-27 RX ORDER — CARVEDILOL 6.25 MG/1
6.25 TABLET ORAL 2 TIMES DAILY WITH MEALS
Status: DISCONTINUED | OUTPATIENT
Start: 2019-01-27 | End: 2019-02-01 | Stop reason: HOSPADM

## 2019-01-27 RX ORDER — LISINOPRIL 5 MG/1
5 TABLET ORAL 2 TIMES DAILY
Status: DISCONTINUED | OUTPATIENT
Start: 2019-01-27 | End: 2019-01-28

## 2019-01-27 RX ADMIN — CLONIDINE HYDROCHLORIDE 0.1 MG: 0.1 TABLET ORAL at 17:09

## 2019-01-27 RX ADMIN — TICAGRELOR 90 MG: 90 TABLET ORAL at 16:15

## 2019-01-27 RX ADMIN — NITROGLYCERIN 30 MCG/MIN: 20 INJECTION INTRAVENOUS at 06:31

## 2019-01-27 RX ADMIN — LISINOPRIL 2.5 MG: 5 TABLET ORAL at 09:10

## 2019-01-27 RX ADMIN — METOPROLOL TARTRATE 25 MG: 25 TABLET ORAL at 09:10

## 2019-01-27 RX ADMIN — Medication 10 ML: at 22:21

## 2019-01-27 RX ADMIN — ASPIRIN 81 MG 81 MG: 81 TABLET ORAL at 09:10

## 2019-01-27 RX ADMIN — CLONIDINE HYDROCHLORIDE 0.1 MG: 0.1 TABLET ORAL at 09:10

## 2019-01-27 RX ADMIN — TICAGRELOR 90 MG: 90 TABLET ORAL at 04:45

## 2019-01-27 RX ADMIN — SPIRONOLACTONE 25 MG: 25 TABLET ORAL at 09:10

## 2019-01-27 RX ADMIN — MORPHINE SULFATE 2 MG: 2 INJECTION, SOLUTION INTRAMUSCULAR; INTRAVENOUS at 01:40

## 2019-01-27 RX ADMIN — POTASSIUM CHLORIDE 20 MEQ: 20 TABLET, EXTENDED RELEASE ORAL at 10:29

## 2019-01-27 RX ADMIN — LISINOPRIL 5 MG: 5 TABLET ORAL at 17:09

## 2019-01-27 RX ADMIN — ACETAMINOPHEN 650 MG: 325 TABLET ORAL at 03:42

## 2019-01-27 RX ADMIN — Medication 10 ML: at 01:40

## 2019-01-27 RX ADMIN — ACETAMINOPHEN 650 MG: 325 TABLET ORAL at 16:13

## 2019-01-27 RX ADMIN — Medication 10 ML: at 04:46

## 2019-01-27 RX ADMIN — ENOXAPARIN SODIUM 40 MG: 40 INJECTION SUBCUTANEOUS at 17:09

## 2019-01-27 RX ADMIN — POTASSIUM CHLORIDE 20 MEQ: 20 TABLET, EXTENDED RELEASE ORAL at 17:09

## 2019-01-27 RX ADMIN — CARVEDILOL 6.25 MG: 6.25 TABLET, FILM COATED ORAL at 16:14

## 2019-01-27 RX ADMIN — ATORVASTATIN CALCIUM 40 MG: 40 TABLET, FILM COATED ORAL at 22:21

## 2019-01-27 RX ADMIN — Medication 10 ML: at 13:36

## 2019-01-27 NOTE — PROGRESS NOTES
Pt continued on NTG drip for BP management now at 70 mcgs  -105 . Pt encouraged to rest however has visitors continuously in room and engaging loud discussion with SO. Will continue to titrate . 0345 pt c/o headache BP stable SBP < 160 attempted to wean NTG . eventually weaned to 30 mcg. 
 
0700 pt without complaints resting in bed NTG at 30 mcg . Bedside shift change report given to [facility] (oncoming nurse) by me (offgoing nurse). Report given with SBAR, MAR and Recent Results.

## 2019-01-27 NOTE — PROCEDURES
[address]  MR#: [id number]  : 1975  ACCOUNT #: [de-identified]   DATE OF SERVICE: 2019    PREOPERATIVE DIAGNOSIS: Acute Coronary Syndrome         POSTOPERATIVE DIAGNOSIS: Coronary Artery Disease    PROCEDURE PERFORMED: Left heart cath and stent placement    SURGEON:  Treasure Boyd MD    ANESTHESIA: Moderate conscious sedation     SPECIMENS REMOVED:  none     INDICATIONS:  An acute coronary syndrome, transferred from [facility] in a 37year-old patient. ASSISTANT:  None. ESTIMATED BLOOD LOSS:  25 mL    COMPLICATIONS:  None apparent. IMPLANTS: stent  In the intermediate artery     CATHETERS:  A 6-Filipino sheath, 6-Filipino pigtail, 6-Taiwanese 4 cm left and right Janet catheter, 6-Filipino right guide, 6-Filipino 4 cm EBU guide, 0.014 Runthrough wire, a 2.5 balloon, a 2.0 balloon, a 6-Filipino GuideLiner in the RCA and a 3.5 x 18 Resolute Integrity stent in the intermediate ramus. HEMODYNAMICS:  Please see accompanying data sheet. This was a complicated course. The patient had inferior ST changes on his EKG. The right groin was prepped and draped in the usual fashion and using a micropuncture the sheath was positioned. Despite copious amounts of Novocain it seemed to hurt a great deal, but there was a great deal of pain throughout and multiple just touching the skin situations. I took a picture of the left system and there was clearly a lot of disease for a 26-year-old male. At this point, we took a guide catheter and took a picture of the right. The distal right had some occlusion and we proceeded with trying to open that up. It was a small vessel and in fact the total occlusion was the [facility] after the takeoff of the PDA. We got a 2.5 across but it did not improve blood flow. We had a 2.0 across and used a GuideLiner to get it out far, but it did not really improve flow. There was some left to right collaterals. At this point, I really did not think the right was the culprit vessel. I wondered if the LAD was the culprit vessel. We put an EBU guide and dealt with that particular vessel. It was clearly a  and very hard. I suspect it could be opened up, but at this point that did not seem like the right thing to do. He did weight 300 pounds and it made getting angles difficult. There is an intermediate that probably had a thrombus and had a lesion at its proximal segment. We wired that intermediate and actually in the process it occluded. That was the culprit vessel, but it took a while to figure that out. I dilated that with a 2.0 balloon and then we readied a 3.5 Resolute Integrity stent and deployed that. There was some discussion at this point of whether he needed some viability studies to determine other segments of his myocardium because the ventriculogram showed a great deal of akinetic/dyskinetic area. I did not place a stent in the proximal intermediate and maybe I should have, but we will see. That can be done easily on another study. At this point, we used a great deal of x-ray time and x-ray contrast.    The hemodynamics were recorded on a separate sheet. He had a marked increase in his blood pressure around 160/110. The LVEDP was elevated at 36. A single plane left ventriculogram was performed in the MEYER projection and revealed the followin. The left ventricular chamber size was enlarged. 2.  The left ventricular wall motion was abnormal.  There was inferobasilar akinesis. There was anterolateral, apical and inferoapical dyskinesis. 3.  There is no significant mitral insufficiency. 4.  The ejection fraction is reduced around 25-30%. The coronaries revealed the followin. The left main was normal.  2.  The LAD was occluded. It was severely diseased in its distal portion and filled via left to left collaterals. There were collaterals from the left to the distal right.   3. The left circumflex was a large but nondominant vessel. There was a high intermediate/diagonal that had a 70-80% lesion in its proximal segment and a 99% lesion in its mid portion, that was probably the culprit vessel. 4.  The right coronary artery was a large and dominant vessel with multiple 40% lesions throughout its entire case. After the takeoff of the PDA, the PLOM was basically occluded. At the end of ballooning the PDA, the following was noted: There was no significant angiographic improvement in the blood flow to the PLOM. At the end of stenting the intermediate ramus distal lesion, the 99% lesion had been reduced to 0%. There was still a lesion in the proximal circumflex that was not addressed. CONCLUSION:  1. Severe diffuse 3-vessel coronary artery disease. 2.  Enlarged left ventricle with marked LV wall abnormalities. 3.  Severe systolic hypertension. 4.  Successful angioplasty, but no significant resolution of the occlusion in the distal right. 5.  Successful angioplasty and stenting of the intermediate ramus vessel.       MD CRISTIANO Desai / Hieu Johnson  D: 01/26/2019 16:23     T: 01/27/2019 06:10  JOB #: [id number]  CC: Kaylene Selby MD  CC: Daniele Martinez MD

## 2019-01-27 NOTE — PROGRESS NOTES
Cardiology Progress Note 1/27/2019 9:53 AM 
 
Admit Date: 1/26/2019 Subjective: No chest pain or increased SOB. No other c/o Visit Vitals BP (!) 152/92 (BP Patient Position: At rest) Pulse 82 Temp 99.4 °F (37.4 °C) Resp 16 SpO2 99% 01/25 1901 - 01/27 0700 In: 602.4 [I.V.:602.4] Out: 400 [Urine:400] Objective:  
  
Physical Exam: 
VS as above Chest CTA Card RRR no gallop Neck s obvious JVD Data Review:  
Labs:   
Hgb 10.8 BUN 11 Creat 1.1 K 3.2 Telemetry: SR  
12 lead NSR inf Qs ST elev inf improved Assessment: 1. Acute inferior wall myocardial infarction. Severe 3 vessel CAD with LV dysfunction, PCI of ramus with BROOK 2. Hypertension and hypertensive urgency. 3.  Medication noncompliance. 4.  History of tobacco abuse. 5.  Family history of coronary artery disease. Plan: Adjust meds and replace K+, CXR. Echo Monday . Viability study at some point.

## 2019-01-27 NOTE — CDMP QUERY
#1 Starr Tran : 
Please clarify if this patient is (was) being treated/managed for:  
 
=> Hypokalemia POA in the setting of NSTEMI POA in 43M treated with Potassium PO 
=> Other explanation of clinical findings 
=> Clinically Undetermined (no explanation for clinical findings) The medical record reflects the following clinical findings, treatment, and risk factors. Risk Factors:  NSTEMI Clinical Indicators:  K+: 3.3 Treatment: Potassium chloride SR 20 mEq po BID Please clarify and document your clinical opinion in the progress notes and discharge summary including the definitive and/or presumptive diagnosis, (suspected or probable), related to the above clinical findings. Please include clinical findings supporting your diagnosis. Thank Taj Elizabeth [facility] 
[phone]

## 2019-01-27 NOTE — CONSULTS
Sushma Swann  MR#: [id number]  : 1975  ACCOUNT #: [de-identified]   DATE OF SERVICE: 2019    CHIEF COMPLAINT:  Chest pain. HISTORY OF PRESENT ILLNESS:  The patient is a 49-year-old man transferred urgently from her [facility] after he presented there complaining of intermittent midsternal chest discomfort for 2 days. His EKG showed inferior ST elevation and he was given IV heparin and aspirin and transported urgently for cardiac catheterization. On arrival, the patient was having intermittent chest discomfort. He has a history of hypertension and chronic tobacco abuse. No prior cardiac history. Lipid status is unknown at this time. No history of diabetes. He did run out of his blood pressure medication several days ago. He denies cocaine abuse. In the emergency room at [facility] his blood pressure was 193/133. PAST MEDICAL HISTORY:  As noted above, history of hypertension, no other active medical problems. PAST SURGICAL HISTORY:  Status post appendectomy. MEDICATIONS:  On admission, the patient was apparently on clonidine. Other blood pressure medications are unknown. SOCIAL HISTORY:  The patient does smoke but denies cocaine abuse. No alcohol. He lives locally. FAMILY HISTORY:  The patient's brother  recently of heart disease. REVIEW OF SYSTEMS:  As noted above, otherwise abbreviated because of the urgent nature of the situation. PHYSICAL EXAMINATION:  GENERAL:  Reveals a middle-aged  male in moderate distress. VITAL SIGNS:  Blood pressure 162/110, pulse 90, respirations 16, temperature 98. HEENT:  Pupils equal.  Oropharynx showed moist oral mucosa. NECK:  Supple. No masses or thyromegaly. No cervical or supraclavicular adenopathy. No carotid bruits. No JVD. CHEST:  Clear anteriorly. SKIN:  Warm and dry. BACK:  No scoliosis.   CARDIAC:  Regular rate and rhythm, normal S1, S2. No obvious murmurs, rubs or gallops. ABDOMEN:  Soft, obese, nontender, no masses or organomegaly. Bowel sounds positive. EXTREMITIES:  No cyanosis, clubbing or edema. Distal pulses 2+ in the feet bilaterally. NEUROLOGIC:  No obvious gross motor deficits. LABORATORY DATA:  Troponin 8.23, hemoglobin 12.5, BUN 12, creatinine 1.2, potassium 3.3. EKG on admission showed sinus rhythm, inferior ST elevation with inferior Q-waves, left atrial enlargement, T-wave inversions laterally. ASSESSMENT:  1. Acute inferior wall myocardial infarction. 2.  Hypertension and hypertensive urgency. 3.  Medication noncompliance. 4.  History of tobacco abuse. 5.  Family history of coronary artery disease. RECOMMENDATIONS:  The patient will be taken urgently to the cardiac catheterization lab. Further recommendations will follow based on the results of the initial angiography.       Miguel Rice MD       [address] [place] [address] /   D: 01/26/2019 19:19     T: 01/26/2019 23:29  JOB #: [id number]

## 2019-01-27 NOTE — CDMP QUERY
#2 Dr. Nicholes Romberg, Doretta Bott K. : 
Patient is noted to have a BMI of 37.3 kg/m2 ( 6'2\", 290 lbs 8oz ). Please clarify if this patient is:  
 
=>Morbidly obese [BMI >/= 40, or BMI > 35 with obesity-related health conditions (e.g. Type 2 DM, HTN, DIONICIO, GERD, depression, OA weight bearing joints, urinary stress incontinence, etc.) ] 
=>Obese (BMI 30 - 39.9) =>Overweight (BMI 25 - 29.9) =>Other explanation of clinical findings =>Clinically Undetermined (no explanation for clinical findings) REFERENCE: 
The [address] [place] [address] has issued a statement indicating that, \"Individuals who are overweight, obese, or morbidly obese are at an increased risk for certain medical conditions when compared to persons of normal weight. Therefore, these conditions are always clinically significant and reportable when documented by the provider. Please clarify and document your clinical opinion in the progress notes and discharge summary, including the definitive and or presumptive diagnosis, (suspected or probable), related to the above clinical findings. Please include clinical findings supporting your diagnosis. Thank Hugo Staples [facility] 
[phone]

## 2019-01-28 ENCOUNTER — HOME HEALTH ADMISSION (OUTPATIENT)
Dept: HOME HEALTH SERVICES | Facility: HOME HEALTH | Age: 44
End: 2019-01-28

## 2019-01-28 LAB
ANION GAP SERPL CALC-SCNC: 8 MMOL/L (ref 5–15)
BUN SERPL-MCNC: 15 MG/DL (ref 6–20)
BUN/CREAT SERPL: 16 (ref 12–20)
CALCIUM SERPL-MCNC: 7.9 MG/DL (ref 8.5–10.1)
CHLORIDE SERPL-SCNC: 106 MMOL/L (ref 97–108)
CO2 SERPL-SCNC: 25 MMOL/L (ref 21–32)
CREAT SERPL-MCNC: 0.93 MG/DL (ref 0.7–1.3)
GLUCOSE SERPL-MCNC: 92 MG/DL (ref 65–100)
POTASSIUM SERPL-SCNC: 3.4 MMOL/L (ref 3.5–5.1)
SODIUM SERPL-SCNC: 139 MMOL/L (ref 136–145)

## 2019-01-28 PROCEDURE — 74011000250 HC RX REV CODE- 250: Performed by: INTERNAL MEDICINE

## 2019-01-28 PROCEDURE — 65660000000 HC RM CCU STEPDOWN

## 2019-01-28 PROCEDURE — 80048 BASIC METABOLIC PNL TOTAL CA: CPT

## 2019-01-28 PROCEDURE — 74011250636 HC RX REV CODE- 250/636: Performed by: INTERNAL MEDICINE

## 2019-01-28 PROCEDURE — 36415 COLL VENOUS BLD VENIPUNCTURE: CPT

## 2019-01-28 PROCEDURE — 74011250637 HC RX REV CODE- 250/637: Performed by: INTERNAL MEDICINE

## 2019-01-28 RX ORDER — LISINOPRIL 5 MG/1
10 TABLET ORAL 2 TIMES DAILY
Status: DISCONTINUED | OUTPATIENT
Start: 2019-01-28 | End: 2019-01-29

## 2019-01-28 RX ORDER — LISINOPRIL AND HYDROCHLOROTHIAZIDE 10; 12.5 MG/1; MG/1
TABLET ORAL DAILY
COMMUNITY

## 2019-01-28 RX ORDER — CLONIDINE HYDROCHLORIDE 0.1 MG/1
0.2 TABLET ORAL 2 TIMES DAILY
Status: DISCONTINUED | OUTPATIENT
Start: 2019-01-28 | End: 2019-01-31

## 2019-01-28 RX ADMIN — CARVEDILOL 6.25 MG: 6.25 TABLET, FILM COATED ORAL at 08:20

## 2019-01-28 RX ADMIN — Medication 10 ML: at 06:09

## 2019-01-28 RX ADMIN — NITROGLYCERIN 30 MCG/MIN: 20 INJECTION INTRAVENOUS at 11:57

## 2019-01-28 RX ADMIN — CLONIDINE HYDROCHLORIDE 0.1 MG: 0.1 TABLET ORAL at 08:20

## 2019-01-28 RX ADMIN — TICAGRELOR 90 MG: 90 TABLET ORAL at 17:27

## 2019-01-28 RX ADMIN — LISINOPRIL 10 MG: 5 TABLET ORAL at 17:27

## 2019-01-28 RX ADMIN — ATORVASTATIN CALCIUM 40 MG: 40 TABLET, FILM COATED ORAL at 21:44

## 2019-01-28 RX ADMIN — Medication 10 ML: at 21:44

## 2019-01-28 RX ADMIN — ENOXAPARIN SODIUM 40 MG: 40 INJECTION SUBCUTANEOUS at 17:28

## 2019-01-28 RX ADMIN — ASPIRIN 81 MG 81 MG: 81 TABLET ORAL at 08:20

## 2019-01-28 RX ADMIN — CLONIDINE HYDROCHLORIDE 0.2 MG: 0.1 TABLET ORAL at 17:27

## 2019-01-28 RX ADMIN — POTASSIUM CHLORIDE 20 MEQ: 20 TABLET, EXTENDED RELEASE ORAL at 17:27

## 2019-01-28 RX ADMIN — POTASSIUM CHLORIDE 20 MEQ: 20 TABLET, EXTENDED RELEASE ORAL at 08:20

## 2019-01-28 RX ADMIN — LISINOPRIL 5 MG: 5 TABLET ORAL at 08:20

## 2019-01-28 RX ADMIN — TICAGRELOR 90 MG: 90 TABLET ORAL at 03:25

## 2019-01-28 RX ADMIN — ACETAMINOPHEN 650 MG: 325 TABLET ORAL at 19:47

## 2019-01-28 RX ADMIN — SPIRONOLACTONE 25 MG: 25 TABLET ORAL at 08:20

## 2019-01-28 RX ADMIN — ACETAMINOPHEN 650 MG: 325 TABLET ORAL at 08:20

## 2019-01-28 RX ADMIN — CARVEDILOL 6.25 MG: 6.25 TABLET, FILM COATED ORAL at 17:27

## 2019-01-28 NOTE — PROGRESS NOTES
Problem: Falls - Risk of 
Goal: *Absence of Falls Document [place] Fall Risk and appropriate interventions in the flowsheet. Outcome: Progressing Towards Goal 
Fall Risk Interventions: 
  
 
  
 
Medication Interventions: Assess postural VS orthostatic hypotension, Evaluate medications/consider consulting pharmacy, Patient to call before getting OOB, Teach patient to arise slowly Problem: Pressure Injury - Risk of 
Goal: *Prevention of pressure injury Document Peyman Scale and appropriate interventions in the flowsheet. Outcome: Progressing Towards Goal 
Pressure Injury Interventions:

## 2019-01-28 NOTE — PROGRESS NOTES
Bedside shift change report given to Ansley Champagne RN (oncoming nurse) by Erma Dumont RN (offgoing nurse). Report included the following information SBAR, Kardex, ED Summary, Procedure Summary, Intake/Output, MAR, Accordion, Recent Results, Med Rec Status, Cardiac Rhythm NSR, Alarm Parameters , Pre Procedure Checklist, Procedure Verification and Quality Measures.

## 2019-01-28 NOTE — CARDIO/PULMONARY
CP Rehab Note: chart review/consult acknowledged Admit: acute MI 
 
Current smoker. Added to avs: 
Smoking Cessation Program:  
This is a free, 
phone/text/email based, smoking cessation program. The program is individualized to meet each patient's needs. To enroll use this link [email or web address]://[employer]/ra/survey/2860 Printed material given and discussed re: Risk factors for CAD, heart healthy diet, medication management and post cardiac catheterization instructions. Discussed post catheterization restrictions including no lifting, no soaking and no manipulating the wrist. Pt given printed teaching materials on MI. Reviewed/instructed on signs/symptoms of angina/MI, the importance of prompt treatment, risk factors, heart healthy diet, taking medications as prescribed, and benefits of cardiac rehab program.  
 
Long discussion with patient and family member on heart healthy changes to consider from this point forward. Both participated in teaching and indicated they would review the materials provided, no additional questions at this time. CP Rehab will follow up.

## 2019-01-28 NOTE — ROUTINE PROCESS
1739: Pt's PM BP meds just given,will monitor BP in approx an hour and adjust Nitro gtt if necessary.

## 2019-01-28 NOTE — PROGRESS NOTES
Cardiology Progress Note 1/28/2019 10:38 AM 
 
Admit Date: 1/26/2019 Subjective:  Stable overnight. No chest pain. No arrthymias. Still on IV NTG Visit Vitals BP (!) 165/105 Pulse 91 Temp 99.2 °F (37.3 °C) Resp 16 SpO2 98% 01/26 1901 - 01/28 0700 In: 1502.4 [P.O.:900; I.V.:602.4] Out: 400 [Urine:400] Objective:  
  
Physical Exam: 
VS as above Chest CTA Card RRR no gallop Data Review:  
Labs: BUN 15 Creat 0.9 LDL 64 HDL 36 Telemetry: SR 
CXR OK Assessment:  
 
 
  
1.  Acute inferior wall myocardial infarction. Severe 3 vessel CAD with LV dysfunction, PCI of ramus with BROOK 2.  Hypertension and hypertensive urgency. Still not controllled 3.  Medication noncompliance. 4.  History of tobacco abuse. 5.  Family history of coronary artery disease. 6. dyslipidemia LDL 64, HDL 36 Plan: Increase clonidine and lisinopril. Try and wean NTG Echo today. Cardiac rehab to see.

## 2019-01-28 NOTE — PROGRESS NOTES
Cm acknowledged consult for medication assistance and historical non-compliance due to cost of medications. Med Assist has met with patient for Medicaid Screening. CM has provided applications for [employer]. Patient informed this CM he has already submitted application for [address]. Blas , RNCM Ext [phone]

## 2019-01-28 NOTE — PROGRESS NOTES
Reason for Admission:   Acute MI 
                
RRAT Score:        5 Plan for utilizing home health:    qualfying dx for [facility] Likelihood of Readmission: Mod to high Transition of Care Plan:     [facility] and follow up with PCP and Cardiology. Patient will need to follow up with [facility] AND [facility] application or [employer] for ongoing medication assistance . CM met with patient to discuss discharge planning. Pt name and  confirmed as pt identifiers. Patient's correct name is Ronni Katz. Patient access informed. Demographics confirmed. Patient is not employed. Lives with his Mother in a 3 story condo. Primarily lives on 1st and 2nd floors. 3 flights of steps throughout [address]. ADL's/IADL's - independent. Does not drives. Uses bus, friends or community transport to/from appointments. DME - none Preferred Rx - none Patient is in need of ongoing assistance with medications. Patient was registered with [facility] AND [facility] and coverage lapsed. Patient has reapplied for coverage. CM will provided [employer] application as well. Patient would like PCP scheduled with [facility] available at [address] [place] [address] or [facility] on [address] [facility]. [employer] application provided to patient. Patient informed CM he will need assistance with meds at time of discharge. Ava Manning RN CM Ext [phone]

## 2019-01-28 NOTE — PROGRESS NOTES
Received shift change report at bedside from [address]. Pt. A&O x 4, stable vital signs, asymptomatic. Pt. Verbalized his wish to be a full code at this time, witness by Dariel Cannon RN.

## 2019-01-29 PROCEDURE — 65660000000 HC RM CCU STEPDOWN

## 2019-01-29 PROCEDURE — 74011250637 HC RX REV CODE- 250/637: Performed by: INTERNAL MEDICINE

## 2019-01-29 PROCEDURE — 74011250636 HC RX REV CODE- 250/636: Performed by: INTERNAL MEDICINE

## 2019-01-29 RX ORDER — LISINOPRIL 20 MG/1
20 TABLET ORAL 2 TIMES DAILY
Status: DISCONTINUED | OUTPATIENT
Start: 2019-01-29 | End: 2019-02-01 | Stop reason: HOSPADM

## 2019-01-29 RX ADMIN — ATORVASTATIN CALCIUM 40 MG: 40 TABLET, FILM COATED ORAL at 22:31

## 2019-01-29 RX ADMIN — Medication 10 ML: at 06:42

## 2019-01-29 RX ADMIN — Medication 10 ML: at 17:11

## 2019-01-29 RX ADMIN — LISINOPRIL 20 MG: 20 TABLET ORAL at 22:31

## 2019-01-29 RX ADMIN — SPIRONOLACTONE 25 MG: 25 TABLET ORAL at 08:33

## 2019-01-29 RX ADMIN — CLONIDINE HYDROCHLORIDE 0.2 MG: 0.1 TABLET ORAL at 17:11

## 2019-01-29 RX ADMIN — POTASSIUM CHLORIDE 20 MEQ: 20 TABLET, EXTENDED RELEASE ORAL at 08:33

## 2019-01-29 RX ADMIN — LISINOPRIL 20 MG: 20 TABLET ORAL at 08:33

## 2019-01-29 RX ADMIN — POTASSIUM CHLORIDE 20 MEQ: 20 TABLET, EXTENDED RELEASE ORAL at 17:11

## 2019-01-29 RX ADMIN — ENOXAPARIN SODIUM 40 MG: 40 INJECTION SUBCUTANEOUS at 17:11

## 2019-01-29 RX ADMIN — ASPIRIN 81 MG 81 MG: 81 TABLET ORAL at 08:32

## 2019-01-29 RX ADMIN — CLONIDINE HYDROCHLORIDE 0.2 MG: 0.1 TABLET ORAL at 08:33

## 2019-01-29 RX ADMIN — TICAGRELOR 90 MG: 90 TABLET ORAL at 17:11

## 2019-01-29 RX ADMIN — CARVEDILOL 6.25 MG: 6.25 TABLET, FILM COATED ORAL at 17:11

## 2019-01-29 RX ADMIN — Medication 10 ML: at 22:31

## 2019-01-29 RX ADMIN — TICAGRELOR 90 MG: 90 TABLET ORAL at 06:42

## 2019-01-29 RX ADMIN — CARVEDILOL 6.25 MG: 6.25 TABLET, FILM COATED ORAL at 08:33

## 2019-01-29 NOTE — CARDIO/PULMONARY
CP Rehab Note: chart review/consult acknowledged 
  
Admit: acute MI 
  
Current smoker. Added to avs: 
Smoking Cessation Program:  
This is a free, 
phone/text/email based, smoking cessation program. The program is individualized to meet each patient's needs. To enroll use this link https://[employer]/ra/survey/2860 
  
Follow up visit today, registered patient for CP Rehab orientation on 3/12/19 at 10:30. Instructed patient to complete packet a couple days prior to appointment, wear gym appropriate clothing and current list of medications. Patient provided packet, no additional questions at this time. *CM working with patient on Care Card or other financial assistance. Patient will also need transportation assistance as well.

## 2019-01-29 NOTE — PROGRESS NOTES
Problem: Falls - Risk of 
Goal: *Absence of Falls Document [place] Fall Risk and appropriate interventions in the flowsheet. Outcome: Progressing Towards Goal 
Fall Risk Interventions: 
  
 
  
 
Medication Interventions: Patient to call before getting OOB

## 2019-01-29 NOTE — PROGRESS NOTES
Bedside and Verbal shift change report given to Ana Paula Rush RN (oncoming nurse) by Vilma Baird RN (offgoing nurse). Report included the following information SBAR, Kardex, Procedure Summary, Intake/Output, MAR, Accordion, Recent Results and Cardiac Rhythm NSR.

## 2019-01-29 NOTE — PROGRESS NOTES
Problem: Pressure Injury - Risk of 
Goal: *Prevention of pressure injury Document Peyman Scale and appropriate interventions in the flowsheet. Outcome: Progressing Towards Goal 
Pressure Injury Interventions: 
  
 
  
 
  
 
Mobility Interventions: Pressure redistribution bed/mattress (bed type)

## 2019-01-30 ENCOUNTER — APPOINTMENT (OUTPATIENT)
Dept: NON INVASIVE DIAGNOSTICS | Age: 44
DRG: 174 | End: 2019-01-30
Attending: INTERNAL MEDICINE
Payer: MEDICAID

## 2019-01-30 ENCOUNTER — APPOINTMENT (OUTPATIENT)
Dept: NUCLEAR MEDICINE | Age: 44
DRG: 174 | End: 2019-01-30
Attending: INTERNAL MEDICINE
Payer: MEDICAID

## 2019-01-30 LAB
ECHO AO ROOT DIAM: 3.47 CM
ECHO AV AREA PEAK VELOCITY: 3.4 CM2
ECHO AV AREA VTI: 3.9 CM2
ECHO AV AREA/BSA PEAK VELOCITY: 1.3 CM2/M2
ECHO AV AREA/BSA VTI: 1.5 CM2/M2
ECHO AV MEAN GRADIENT: 2.4 MMHG
ECHO AV PEAK GRADIENT: 3.8 MMHG
ECHO AV PEAK VELOCITY: 97.9 CM/S
ECHO AV VTI: 18.53 CM
ECHO EST RA PRESSURE: 10 MMHG
ECHO LV INTERNAL DIMENSION DIASTOLIC: 5.81 CM (ref 4.2–5.9)
ECHO LV INTERNAL DIMENSION SYSTOLIC: 4.48 CM
ECHO LV IVSD: 1.5 CM (ref 0.6–1)
ECHO LV MASS 2D: 397.4 G (ref 88–224)
ECHO LV MASS INDEX 2D: 156.2 G/M2 (ref 49–115)
ECHO LV POSTERIOR WALL DIASTOLIC: 1.09 CM (ref 0.6–1)
ECHO LVOT DIAM: 2.17 CM
ECHO LVOT PEAK GRADIENT: 3.2 MMHG
ECHO LVOT PEAK VELOCITY: 89.19 CM/S
ECHO LVOT SV: 72.5 ML
ECHO LVOT VTI: 19.54 CM
ECHO MV A VELOCITY: 53.85 CM/S
ECHO MV AREA PHT: 3.5 CM2
ECHO MV E DECELERATION TIME (DT): 218.4 MS
ECHO MV E VELOCITY: 0.77 CM/S
ECHO MV E/A RATIO: 0.01
ECHO MV PRESSURE HALF TIME (PHT): 63.3 MS
ECHO MV REGURGITANT PEAK GRADIENT: 69.2 MMHG
ECHO MV REGURGITANT PEAK VELOCITY: 415.86 CM/S
ECHO PULMONARY ARTERY SYSTOLIC PRESSURE (PASP): 15.9 MMHG
ECHO PV MAX VELOCITY: 62.68 CM/S
ECHO PV PEAK GRADIENT: 1.6 MMHG
ECHO RIGHT VENTRICULAR SYSTOLIC PRESSURE (RVSP): 15.9 MMHG
ECHO RV INTERNAL DIMENSION: 2.66 CM
ECHO TV REGURGITANT MAX VELOCITY: 121.02 CM/S
ECHO TV REGURGITANT PEAK GRADIENT: 5.9 MMHG

## 2019-01-30 PROCEDURE — 65660000000 HC RM CCU STEPDOWN

## 2019-01-30 PROCEDURE — 78452 HT MUSCLE IMAGE SPECT MULT: CPT

## 2019-01-30 PROCEDURE — A9500 TC99M SESTAMIBI: HCPCS

## 2019-01-30 PROCEDURE — 93306 TTE W/DOPPLER COMPLETE: CPT

## 2019-01-30 PROCEDURE — 74011250636 HC RX REV CODE- 250/636: Performed by: INTERNAL MEDICINE

## 2019-01-30 PROCEDURE — 74011250637 HC RX REV CODE- 250/637: Performed by: INTERNAL MEDICINE

## 2019-01-30 RX ORDER — SODIUM CHLORIDE 0.9 % (FLUSH) 0.9 %
10 SYRINGE (ML) INJECTION AS NEEDED
Status: DISCONTINUED | OUTPATIENT
Start: 2019-01-30 | End: 2019-02-01 | Stop reason: HOSPADM

## 2019-01-30 RX ADMIN — POTASSIUM CHLORIDE 20 MEQ: 20 TABLET, EXTENDED RELEASE ORAL at 17:17

## 2019-01-30 RX ADMIN — Medication 10 ML: at 06:29

## 2019-01-30 RX ADMIN — TICAGRELOR 90 MG: 90 TABLET ORAL at 17:17

## 2019-01-30 RX ADMIN — CARVEDILOL 6.25 MG: 6.25 TABLET, FILM COATED ORAL at 17:17

## 2019-01-30 RX ADMIN — SPIRONOLACTONE 25 MG: 25 TABLET ORAL at 08:17

## 2019-01-30 RX ADMIN — LISINOPRIL 20 MG: 20 TABLET ORAL at 21:51

## 2019-01-30 RX ADMIN — Medication 10 ML: at 21:51

## 2019-01-30 RX ADMIN — CLONIDINE HYDROCHLORIDE 0.2 MG: 0.1 TABLET ORAL at 08:17

## 2019-01-30 RX ADMIN — CARVEDILOL 6.25 MG: 6.25 TABLET, FILM COATED ORAL at 08:17

## 2019-01-30 RX ADMIN — REGADENOSON 0.4 MG: 0.08 INJECTION, SOLUTION INTRAVENOUS at 12:11

## 2019-01-30 RX ADMIN — POTASSIUM CHLORIDE 20 MEQ: 20 TABLET, EXTENDED RELEASE ORAL at 08:17

## 2019-01-30 RX ADMIN — TICAGRELOR 90 MG: 90 TABLET ORAL at 06:28

## 2019-01-30 RX ADMIN — ATORVASTATIN CALCIUM 40 MG: 40 TABLET, FILM COATED ORAL at 21:51

## 2019-01-30 RX ADMIN — CLONIDINE HYDROCHLORIDE 0.2 MG: 0.1 TABLET ORAL at 17:17

## 2019-01-30 RX ADMIN — LISINOPRIL 20 MG: 20 TABLET ORAL at 08:17

## 2019-01-30 RX ADMIN — Medication 10 ML: at 12:12

## 2019-01-30 RX ADMIN — ASPIRIN 81 MG 81 MG: 81 TABLET ORAL at 08:17

## 2019-01-30 NOTE — PROGRESS NOTES
Received shift change report at bedside from [facility]. Pt. A&O x4, asymptomatic, stable vital signs. Right groin cath site no bleeding/ no hematoma.

## 2019-01-30 NOTE — PROGRESS NOTES
Pt stressed with Rex Veliz. Pt will have images around 1:30pm today. Consult ordering physician regarding diet.

## 2019-01-30 NOTE — PROGRESS NOTES
Problem: Falls - Risk of 
Goal: *Absence of Falls Document Ebb [place] Fall Risk and appropriate interventions in the flowsheet. Outcome: Progressing Towards Goal 
Fall Risk Interventions: 
  
 
  
 
Medication Interventions: Assess postural VS orthostatic hypotension, Evaluate medications/consider consulting pharmacy, Patient to call before getting OOB, Teach patient to arise slowly Problem: Pressure Injury - Risk of 
Goal: *Prevention of pressure injury Document Peyman Scale and appropriate interventions in the flowsheet. Outcome: Progressing Towards Goal 
Pressure Injury Interventions: 
  
 
  
 
  
 
Mobility Interventions: Pressure redistribution bed/mattress (bed type)

## 2019-01-30 NOTE — PROGRESS NOTES
Bedside shift change report given to Mortimer Bible, RN (oncoming nurse) by Will Spaulding RN (offgoing nurse). Report included the following information SBAR, Kardex, Procedure Summary, Intake/Output, MAR, Accordion, Recent Results, Med Rec Status, Cardiac Rhythm NSR, Alarm Parameters , Pre Procedure Checklist, Procedure Verification and Quality Measures.

## 2019-01-30 NOTE — PROGRESS NOTES
Report given to [facility] GOGO Confirmed code status at this time. FULL CODE.

## 2019-01-30 NOTE — PROGRESS NOTES
Cardiology Progress Note 1/30/2019 12:46 PM 
 
Admit Date: 1/26/2019 Subjective: No chest pain. Nuclear done today- results pending. Echo done as well. BP better- off IV NTG Visit Vitals BP (!) 134/96 Pulse 80 Temp 98.3 °F (36.8 °C) Resp 14 Ht 6' 2\" (1.88 m) Wt 131.5 kg (290 lb) SpO2 99% BMI 37.23 kg/m²  
 
01/28 1901 - 01/30 0700 In: 540 [P.O.:540] Out: 1150 [QUAUV:8385] Objective:  
  
Physical Exam: 
VS as above Chest CTA Card RRR no gallop Data Review:  
Labs:   
 
 
Telemetry: SR R 80 Assessment: 1.  Acute inferior wall myocardial infarction. Severe 3 vessel CAD with LV dysfunction, PCI of ramus with BROOK  
2.  Hypertension and hypertensive urgency. Still not controllled 3.  Medication noncompliance. 4.  History of tobacco abuse. 5.  Family history of coronary artery disease. 6. dyslipidemia LDL 64, HDL 36  
  
Plan: Will review nuclear. Cont current Rx for now.  Possible PCI of LAD prior to d/c

## 2019-01-30 NOTE — ROUTINE PROCESS
1010: RN noted no code status for pt. Will call MD for permission to order full code status. See note from last night at [facility], pt's wishes are for full code.

## 2019-01-31 ENCOUNTER — APPOINTMENT (OUTPATIENT)
Dept: NUCLEAR MEDICINE | Age: 44
DRG: 174 | End: 2019-01-31
Attending: INTERNAL MEDICINE
Payer: MEDICAID

## 2019-01-31 PROCEDURE — 99152 MOD SED SAME PHYS/QHP 5/>YRS: CPT | Performed by: INTERNAL MEDICINE

## 2019-01-31 PROCEDURE — 77030019697 HC SYR ANGI INFL MRTM -B

## 2019-01-31 PROCEDURE — 92920 PRQ TRLUML C ANGIOP 1ART&/BR: CPT | Performed by: INTERNAL MEDICINE

## 2019-01-31 PROCEDURE — C1769 GUIDE WIRE: HCPCS

## 2019-01-31 PROCEDURE — 77030029065 HC DRSG HEMO QCLOT ZMED -B

## 2019-01-31 PROCEDURE — 65660000000 HC RM CCU STEPDOWN

## 2019-01-31 PROCEDURE — C1760 CLOSURE DEV, VASC: HCPCS

## 2019-01-31 PROCEDURE — 74011250636 HC RX REV CODE- 250/636: Performed by: INTERNAL MEDICINE

## 2019-01-31 PROCEDURE — 74011250637 HC RX REV CODE- 250/637: Performed by: INTERNAL MEDICINE

## 2019-01-31 PROCEDURE — C1769 GUIDE WIRE: HCPCS | Performed by: INTERNAL MEDICINE

## 2019-01-31 PROCEDURE — 74011636320 HC RX REV CODE- 636/320: Performed by: INTERNAL MEDICINE

## 2019-01-31 PROCEDURE — C1894 INTRO/SHEATH, NON-LASER: HCPCS

## 2019-01-31 PROCEDURE — C1887 CATHETER, GUIDING: HCPCS

## 2019-01-31 PROCEDURE — 85347 COAGULATION TIME ACTIVATED: CPT

## 2019-01-31 PROCEDURE — 99153 MOD SED SAME PHYS/QHP EA: CPT | Performed by: INTERNAL MEDICINE

## 2019-01-31 PROCEDURE — C1894 INTRO/SHEATH, NON-LASER: HCPCS | Performed by: INTERNAL MEDICINE

## 2019-01-31 PROCEDURE — C1760 CLOSURE DEV, VASC: HCPCS | Performed by: INTERNAL MEDICINE

## 2019-01-31 PROCEDURE — 77030019697 HC SYR ANGI INFL MRTM -B: Performed by: INTERNAL MEDICINE

## 2019-01-31 PROCEDURE — 74011250636 HC RX REV CODE- 250/636

## 2019-01-31 PROCEDURE — 77030003623 HC NDL PERC VASC TELE -C

## 2019-01-31 PROCEDURE — C1725 CATH, TRANSLUMIN NON-LASER: HCPCS

## 2019-01-31 PROCEDURE — 77030003623 HC NDL PERC VASC TELE -C: Performed by: INTERNAL MEDICINE

## 2019-01-31 PROCEDURE — C1725 CATH, TRANSLUMIN NON-LASER: HCPCS | Performed by: INTERNAL MEDICINE

## 2019-01-31 PROCEDURE — C1887 CATHETER, GUIDING: HCPCS | Performed by: INTERNAL MEDICINE

## 2019-01-31 RX ORDER — HEPARIN SODIUM 200 [USP'U]/100ML
INJECTION, SOLUTION INTRAVENOUS
Status: DISCONTINUED | OUTPATIENT
Start: 2019-01-31 | End: 2019-01-31

## 2019-01-31 RX ORDER — HEPARIN SODIUM 1000 [USP'U]/ML
INJECTION, SOLUTION INTRAVENOUS; SUBCUTANEOUS AS NEEDED
Status: DISCONTINUED | OUTPATIENT
Start: 2019-01-31 | End: 2019-01-31

## 2019-01-31 RX ORDER — MIDAZOLAM HYDROCHLORIDE 1 MG/ML
INJECTION, SOLUTION INTRAMUSCULAR; INTRAVENOUS AS NEEDED
Status: DISCONTINUED | OUTPATIENT
Start: 2019-01-31 | End: 2019-01-31

## 2019-01-31 RX ORDER — FENTANYL CITRATE 50 UG/ML
INJECTION, SOLUTION INTRAMUSCULAR; INTRAVENOUS AS NEEDED
Status: DISCONTINUED | OUTPATIENT
Start: 2019-01-31 | End: 2019-01-31

## 2019-01-31 RX ORDER — HYDROMORPHONE HYDROCHLORIDE 1 MG/ML
INJECTION, SOLUTION INTRAMUSCULAR; INTRAVENOUS; SUBCUTANEOUS AS NEEDED
Status: DISCONTINUED | OUTPATIENT
Start: 2019-01-31 | End: 2019-01-31

## 2019-01-31 RX ORDER — CLONIDINE HYDROCHLORIDE 0.1 MG/1
0.3 TABLET ORAL 2 TIMES DAILY
Status: DISCONTINUED | OUTPATIENT
Start: 2019-01-31 | End: 2019-02-01 | Stop reason: HOSPADM

## 2019-01-31 RX ORDER — LIDOCAINE HYDROCHLORIDE 10 MG/ML
INJECTION, SOLUTION EPIDURAL; INFILTRATION; INTRACAUDAL; PERINEURAL AS NEEDED
Status: DISCONTINUED | OUTPATIENT
Start: 2019-01-31 | End: 2019-01-31

## 2019-01-31 RX ADMIN — MORPHINE SULFATE 2 MG: 2 INJECTION, SOLUTION INTRAMUSCULAR; INTRAVENOUS at 19:12

## 2019-01-31 RX ADMIN — POTASSIUM CHLORIDE 20 MEQ: 20 TABLET, EXTENDED RELEASE ORAL at 09:28

## 2019-01-31 RX ADMIN — ATORVASTATIN CALCIUM 40 MG: 40 TABLET, FILM COATED ORAL at 21:02

## 2019-01-31 RX ADMIN — CLONIDINE HYDROCHLORIDE 0.2 MG: 0.1 TABLET ORAL at 09:28

## 2019-01-31 RX ADMIN — ASPIRIN 81 MG 81 MG: 81 TABLET ORAL at 09:28

## 2019-01-31 RX ADMIN — LISINOPRIL 20 MG: 20 TABLET ORAL at 09:28

## 2019-01-31 RX ADMIN — TICAGRELOR 90 MG: 90 TABLET ORAL at 21:02

## 2019-01-31 RX ADMIN — CLONIDINE HYDROCHLORIDE 0.3 MG: 0.1 TABLET ORAL at 18:09

## 2019-01-31 RX ADMIN — CARVEDILOL 6.25 MG: 6.25 TABLET, FILM COATED ORAL at 09:28

## 2019-01-31 RX ADMIN — MORPHINE SULFATE 2 MG: 2 INJECTION, SOLUTION INTRAMUSCULAR; INTRAVENOUS at 15:00

## 2019-01-31 RX ADMIN — SPIRONOLACTONE 25 MG: 25 TABLET ORAL at 09:28

## 2019-01-31 RX ADMIN — LISINOPRIL 20 MG: 20 TABLET ORAL at 21:02

## 2019-01-31 RX ADMIN — POTASSIUM CHLORIDE 20 MEQ: 20 TABLET, EXTENDED RELEASE ORAL at 18:09

## 2019-01-31 RX ADMIN — TICAGRELOR 90 MG: 90 TABLET ORAL at 09:28

## 2019-01-31 RX ADMIN — CARVEDILOL 6.25 MG: 6.25 TABLET, FILM COATED ORAL at 18:09

## 2019-01-31 NOTE — ROUTINE PROCESS
0845: Fluids started before cath, consents signed, pt prepped. 1020: Pt just left for CCL. 1217: Pt just arrived back from CCL. Site CDI, no complaints. 1450: Pt had slow bleed at R groin site. Hand held pressure for 5 min, bleeding resolved, new quick clot and tegaderm applied. 1608: Pt continues resting comfortably, site CDI, no complaints. 1852: Pt's bedrest has ended. Pt ambulated in hallway with RN, tolerated well, tele unremarkable, site CDI.

## 2019-01-31 NOTE — PROGRESS NOTES
NUC MED: LEXISC[place] Cardiac stress completed @ 0930. Please check with Physician regarding patient diet.

## 2019-01-31 NOTE — PROGRESS NOTES
Cardiology Progress Note 1/31/2019 2:18 PM 
 
Admit Date: 1/26/2019 Subjective:  PCI attempt this am of LAD not successful. Currently resting comfortably, no chest pain or other c/o. Visit Vitals BP (!) 139/96 Pulse 73 Temp 98.2 °F (36.8 °C) Resp 16 Ht 6' 2\" (1.88 m) Wt 131.5 kg (290 lb) SpO2 100% BMI 37.23 kg/m²  
 
01/29 1901 - 01/31 0700 In: 300 [P.O.:300] Out: 400 [Urine:400] Objective:  
  
Physical Exam: 
VS as above Chest CTA Card RRR no gallop Data Review:  
Labs:   
 
 
Telemetry: SR R 70 Assessment: 1.  Acute inferior wall myocardial infarction. Severe 3 vessel CAD with LV dysfunction, PCI of ramus with BROOK  
2.  Hypertension and hypertensive urgency. Still not controllled 3.  Medication noncompliance. 4.  History of tobacco abuse. 5.  Family history of coronary artery disease. 6. dyslipidemia LDL 64, HDL 36  
7. S/p unsuccessful PCI attempt of LAD earlier today Plan: Cont medical Rx for now with consieration for possible CABG at a later date. Likely d/c tomm. Increase clonidine.

## 2019-01-31 NOTE — PROGRESS NOTES
See Cath Report. Unsuccessful attempt to open the occluded mid LAD . Chronic 100% occlusion. Unable to cross with a wire. No further intervention done

## 2019-01-31 NOTE — PROGRESS NOTES
Upon entering the patient's room to assess patient, noticed that the patient was fully dressed in street clothes. Patient was asked where he was going and stated he was \"going for a walk. \" Patient came out of room with coat on. Patient was told that he needs to stay on the second floor, however, it is believed that the patient may have gone out to smoke.

## 2019-01-31 NOTE — PROGRESS NOTES
Bedside and Verbal shift change report given to Beba Mckeon RN (oncoming nurse) by Virginia Ramirez RN (offgoing nurse). Report included the following information SBAR.

## 2019-01-31 NOTE — PROGRESS NOTES
Spiritual Care Assessment/Progress Note [facility] 
 
 
NAME: Leena Jordan      MRN: [id number] AGE: 37 y.o. SEX: male Episcopal Affiliation: [facility]  
Language: [place] 1/31/2019     Total Time (in minutes): 8 Spiritual Assessment begun in [facility] CARDIAC CATH LAB through conversation with: 
  
    [x]Patient        [] Family    [] Friend(s) Reason for Consult: Initial/Spiritual assessment, patient floor, Initial visit Spiritual beliefs: (Please include comment if needed) 
   [] Identifies with a abdias tradition:     
   [] Supported by a abdias community:        
   [] Claims no spiritual orientation:       
   [] Seeking spiritual identity:            
   [] Adheres to an individual form of spirituality:       
   [x] Not able to assess:                   
 
    
Identified resources for coping:  
   [] Prayer                           
   [] Music                  [] Guided Imagery 
   [] Family/friends                 [] Pet visits [] Devotional reading                         [] Unknown 
   [] Other:                                         
 
 
Interventions offered during this visit: (See comments for more details) Patient Interventions: Initial visit, Initial/Spiritual assessment, patient floor Plan of Care: 
 
 [] Support spiritual and/or cultural needs  
 [] Support AMD and/or advance care planning process    
 [] Support grieving process 
 [] Coordinate Rites and/or Rituals  
 [] Coordination with community clergy [] No spiritual needs identified at this time 
 [] Detailed Plan of Care below (See Comments)  [] Make referral to Music Therapy 
[] Make referral to Pet Therapy    
[] Make referral to Addiction services 
[] Make referral to [facility] 
[] Make referral to Spiritual Care Partner 
[] No future visits requested       
[x] Follow up visits as needed Comments:  attempted visit in Cardiopulmonary, but patient was having a procedure in the Cath Lab at this time. [employer] will attempt to follow up at a later time. MD Vianneyiv Pager: [phone]

## 2019-02-01 VITALS
RESPIRATION RATE: 18 BRPM | TEMPERATURE: 98.6 F | HEART RATE: 76 BPM | DIASTOLIC BLOOD PRESSURE: 97 MMHG | HEIGHT: 74 IN | WEIGHT: 281.09 LBS | OXYGEN SATURATION: 97 % | SYSTOLIC BLOOD PRESSURE: 147 MMHG | BODY MASS INDEX: 36.07 KG/M2

## 2019-02-01 LAB
ANION GAP SERPL CALC-SCNC: 7 MMOL/L (ref 5–15)
BUN SERPL-MCNC: 15 MG/DL (ref 6–20)
BUN/CREAT SERPL: 13 (ref 12–20)
CALCIUM SERPL-MCNC: 8.4 MG/DL (ref 8.5–10.1)
CHLORIDE SERPL-SCNC: 106 MMOL/L (ref 97–108)
CO2 SERPL-SCNC: 24 MMOL/L (ref 21–32)
CREAT SERPL-MCNC: 1.13 MG/DL (ref 0.7–1.3)
GLUCOSE SERPL-MCNC: 108 MG/DL (ref 65–100)
POTASSIUM SERPL-SCNC: 4.2 MMOL/L (ref 3.5–5.1)
SODIUM SERPL-SCNC: 137 MMOL/L (ref 136–145)

## 2019-02-01 PROCEDURE — 74011250637 HC RX REV CODE- 250/637: Performed by: INTERNAL MEDICINE

## 2019-02-01 PROCEDURE — 80048 BASIC METABOLIC PNL TOTAL CA: CPT

## 2019-02-01 PROCEDURE — 74011250636 HC RX REV CODE- 250/636: Performed by: INTERNAL MEDICINE

## 2019-02-01 PROCEDURE — 90471 IMMUNIZATION ADMIN: CPT

## 2019-02-01 PROCEDURE — 90686 IIV4 VACC NO PRSV 0.5 ML IM: CPT | Performed by: INTERNAL MEDICINE

## 2019-02-01 PROCEDURE — 36415 COLL VENOUS BLD VENIPUNCTURE: CPT

## 2019-02-01 RX ORDER — LISINOPRIL 20 MG/1
20 TABLET ORAL 2 TIMES DAILY
Qty: 30 TAB | Refills: 6 | Status: SHIPPED | OUTPATIENT
Start: 2019-02-01

## 2019-02-01 RX ORDER — ATORVASTATIN CALCIUM 40 MG/1
40 TABLET, FILM COATED ORAL
Qty: 30 TAB | Refills: 6 | Status: SHIPPED | OUTPATIENT
Start: 2019-02-01

## 2019-02-01 RX ORDER — CARVEDILOL 6.25 MG/1
6.25 TABLET ORAL 2 TIMES DAILY WITH MEALS
Qty: 60 TAB | Refills: 6 | Status: SHIPPED | OUTPATIENT
Start: 2019-02-01

## 2019-02-01 RX ORDER — CARVEDILOL 6.25 MG/1
6.25 TABLET ORAL 2 TIMES DAILY WITH MEALS
Qty: 60 TAB | Refills: 6 | Status: SHIPPED | OUTPATIENT
Start: 2019-02-01 | End: 2019-02-01

## 2019-02-01 RX ORDER — NITROGLYCERIN 0.4 MG/1
0.4 TABLET SUBLINGUAL
Qty: 1 BOTTLE | Refills: 6 | Status: SHIPPED | OUTPATIENT
Start: 2019-02-01

## 2019-02-01 RX ORDER — SPIRONOLACTONE 25 MG/1
25 TABLET ORAL DAILY
Qty: 30 TAB | Refills: 6 | Status: SHIPPED | OUTPATIENT
Start: 2019-02-02

## 2019-02-01 RX ORDER — CLONIDINE HYDROCHLORIDE 0.3 MG/1
0.3 TABLET ORAL 2 TIMES DAILY
Qty: 60 TAB | Refills: 6 | Status: SHIPPED | OUTPATIENT
Start: 2019-02-01

## 2019-02-01 RX ORDER — CLOPIDOGREL BISULFATE 75 MG/1
75 TABLET ORAL DAILY
Qty: 30 TAB | Refills: 6 | Status: SHIPPED | OUTPATIENT
Start: 2019-02-01

## 2019-02-01 RX ORDER — CLOPIDOGREL 300 MG/1
600 TABLET, FILM COATED ORAL ONCE
Status: COMPLETED | OUTPATIENT
Start: 2019-02-01 | End: 2019-02-01

## 2019-02-01 RX ORDER — CLOPIDOGREL 300 MG/1
300 TABLET, FILM COATED ORAL ONCE
Status: DISCONTINUED | OUTPATIENT
Start: 2019-02-01 | End: 2019-02-01

## 2019-02-01 RX ORDER — GUAIFENESIN 100 MG/5ML
81 LIQUID (ML) ORAL DAILY
Qty: 30 TAB | Refills: 6 | Status: SHIPPED | OUTPATIENT
Start: 2019-02-02

## 2019-02-01 RX ADMIN — POTASSIUM CHLORIDE 20 MEQ: 20 TABLET, EXTENDED RELEASE ORAL at 08:34

## 2019-02-01 RX ADMIN — CLONIDINE HYDROCHLORIDE 0.3 MG: 0.1 TABLET ORAL at 08:33

## 2019-02-01 RX ADMIN — CLOPIDOGREL BISULFATE 600 MG: 300 TABLET, FILM COATED ORAL at 14:12

## 2019-02-01 RX ADMIN — SPIRONOLACTONE 25 MG: 25 TABLET ORAL at 08:34

## 2019-02-01 RX ADMIN — CARVEDILOL 6.25 MG: 6.25 TABLET, FILM COATED ORAL at 08:34

## 2019-02-01 RX ADMIN — ASPIRIN 81 MG 81 MG: 81 TABLET ORAL at 08:33

## 2019-02-01 RX ADMIN — LISINOPRIL 20 MG: 20 TABLET ORAL at 08:34

## 2019-02-01 RX ADMIN — TICAGRELOR 90 MG: 90 TABLET ORAL at 08:33

## 2019-02-01 RX ADMIN — MORPHINE SULFATE 2 MG: 2 INJECTION, SOLUTION INTRAMUSCULAR; INTRAVENOUS at 04:13

## 2019-02-01 RX ADMIN — Medication 10 ML: at 04:13

## 2019-02-01 RX ADMIN — INFLUENZA VIRUS VACCINE 0.5 ML: 15; 15; 15; 15 SUSPENSION INTRAMUSCULAR at 13:28

## 2019-02-01 NOTE — PROGRESS NOTES
Problem: Pressure Injury - Risk of 
Goal: *Prevention of pressure injury Document Peyman Scale and appropriate interventions in the flowsheet. Outcome: Progressing Towards Goal 
Pressure Injury Interventions: Activity Interventions: Increase time out of bed Mobility Interventions: Pressure redistribution bed/mattress (bed type)

## 2019-02-01 NOTE — PROGRESS NOTES
Cm acknowledged discharge order. Scripts will be needed to price prior to discharge. Nursing aware. 1115am 
CM retrieved scripts from patient. Copied and faxed to: 
[phone]  for pricing. CM Department will pay for scripts. Patient is uninsured and does not have the funds. CM Depeartment will pay for initial fill only. Faxed to:  [phone] Meds totalled $73.58 To be paid by [address] Forms faxed to: 
[phone] Jasper Camarillo at [facility] informed. They will call back when ready for patient. Victorino JOEL spoke with Maria Dolores at [facility]. [place] fill - they will partial fill and patient will need to come back on Monday to get rest of fill. CM agreed with plan. CM met with patient to explain that [phone] will only pay for the 1st month's medication. Patient is responsible for all refills and may have to request transfer of scripts depending on where he gets scripts refilled. Patient verbalized understanding. Nursing informed CM tasks are complete. Bobbi Quintero RN CM Ext [phone]

## 2019-02-01 NOTE — DISCHARGE INSTRUCTIONS
Smoking Cessation Program:   This is a free,  phone/text/email based, smoking cessation program. The program is individualized to meet each patient's needs. To enroll use this link [email or web address]  Cardiology Discharge Summary     Patient ID:  Giuseppe Saldivar  [id number]  26 y.o.  1975    Admit Date: 2019    Discharge Date: 2019     Admitting Physician: Cezar Garibay MD     Discharge Physician: Cezar Garibay MD    Patient Instructions:       Referenced discharge instructions provided by nursing for diet and activity. Follow-up with Dr Clarisa Mckenzie 1-2 weeks [phone]     Signed:  Cezar Garibay MD  2019  10:47 AM      [address]   ([phone]) [phone], [address]    [email or web address]    Patient Discharge Instructions    Giuseppe Saldivar / [id number] : 1975    Admitted 2019 Discharged 2019       · It is important that you take the medication exactly as they are prescribed. · Keep your medication in the bottles provided by the pharmacist and keep a list of the medication names, dosages, and times to be taken in your wallet. · Do not take other medications without consulting your doctor. BRING ALL OF YOUR MEDICINES or a list of medicines with dosages TO YOUR OFFICE VISIT. Follow-up:   Follow-up Information     Follow up With Specialties Details Why Contact Info    Mason Landry MD Internal Medicine Go on 2019 For new patient appointment at 10:45AM  [address]  [address] [facility]  [address] [facility]      Juliette Celis MD Cardiology In 2 weeks Cardiology - hospital follow up visit. Nurse at office will call you to schedule. [address]  [id number]  [facility] [address] [phone]      [address] [address]  This is your homehealth provider.   [facility] [phone] [facility] Cardiac Rehabilitation On 3/12/2019 10:30 Please arrive a few minutes early, complete packet a couple days prior to appointment, wear gym appropriate clothing and current list of medications. [address] [place] [address]  [phone]    None    None ([phone]) Patient stated that they have no PCP            Heart Attack: After Your Hospitalization     Your Care Instructions       A heart attack (myocardial infarction, or MI) occurs when one or more of the coronary arteries, which supply the heart with oxygen-rich blood, is blocked. A blockage usually occurs when plaque inside the artery breaks open and a blood clot forms in the artery. After a heart attack, you may be worried about your future. Over the next several weeks, your heart will start to heal. Although it is sometimes hard to break old habits, you can prevent another heart attack by making some lifestyle changes and by taking medicines. You may use the following information for ideas about what to do at home to speed your recovery. Follow-up care is a key part of your treatment and safety. Be sure to make and go to all appointments, and call your doctor if you are having problems. It is also a good idea to keep a list of the medicines you take, including dose. How can you care for yourself at home? Activity  Increase your activities slowly. Take short rest breaks when you get tired. As you are able, get more exercise. Walking is a good choice. Bit by bit, increase the amount you walk every day. Try for at least 30 minutes on most days of the week. You also may want to swim, bike, or do other activities. Cardiac rehabilitation (rehab) program is strongly recommended. Cardiac rehab includes supervised exercise, help with diet and lifestyle changes, and emotional support. It may reduce your risk of future heart problems. Do not drive until your doctor says you can. You can have sex when you are strong enough.   This usually means when you can easily walk around or climb stairs. Talk with your doctor if you have any concerns. Do not take sildenafil citrate (Viagra), tadalafil (Cialis), or vardenafil (Levitra) if you are taking nitroglycerin. Lifestyle changes  Do not smoke. Smoking increases your risk of a heart attack. If you need help quitting, talk to your doctor about stop-smoking programs and medicines. These can increase your chances of quitting for good. Eat a heart-healthy diet that is low in cholesterol, saturated fat, and salt, and is full of fruits, vegetables and whole-grains. Eat at least two servings of fish each week. You may get more details about how to eat healthy, but these tips can help you get started. Our website has more information:  [email or web address]  Avoid colds and flu. Get a pneumococcal vaccine shot. If you have had one before, ask your primary care doctor whether you need a second dose. Get a flu shot every fall. If you must be around people with colds or flu, wash your hands often. Medicines  Take your medicines exactly as prescribed. Call your doctor if you think you are having a problem with your medicine. You may need several medicines. Angiotensin-converting enzyme (ACE) inhibitors, beta-blockers, and statins can help prevent another heart attack. ACE inhibitors control your blood pressure, and statins help lower cholesterol. Aspirin and other blood thinners help prevent blood clots. Blood clots can cause a stroke or heart attack. If Plavix, or Brilinta, or Effient is prescribed with your aspirin, you must take them to prevent your stent from clotting. You will likely need them for at least 1 to 2 years. If your doctor has given you nitroglycerin, keep it with you at all times. If you have chest pain, sit down and rest, and take the first dose of nitroglycerin if the discomfort does not resolve. If chest pain gets worse or is not getting better within 5 minutes, call 911 immediately.  Stay on the phone with the emergency ; he or she will give you further instructions. Be sure to tell your doctor about any chest pain you have had, even if it went away. Do not take any over-the-counter medicines, vitamins, or herbal products without talking to your doctor first.    Mental health  Talk to your family, friends, or a counselor about your feelings. It is normal to feel frightened, angry, hopeless, helpless, and even guilty. Talking openly about bad feelings can help you cope. If the blues last, talk to your primary care doctor. When should you call for help? Call 911 anytime you think you may need emergency care. For example, call if:  You have signs of a heart attack. These may include:  Chest pain or pressure. Sweating. Shortness of breath. Nausea or vomiting. Pain that spreads from the chest to the neck, jaw, or one or both shoulders or arms. Dizziness or lightheadedness. A fast or irregular pulse. After calling 911, chew 1 adult-strength aspirin. Wait for an ambulance. Do not try to drive yourself. You passed out (lost consciousness). You feel like you are having another heart attack. Call your doctor now or seek immediate medical care if:  You have had any chest pain, even if it has gone away. You have new or increased shortness of breath. You are dizzy or lightheaded, or you feel like you may faint. Watch closely for changes in your health, and be sure to contact your doctor if you have any problems, including gaining 5 pounds or more. Cardiac Catheterization  Discharge Instructions     You may take a shower. Be sure to get the dressing wet and then remove it; gently wash the area with warm soapy water. Pat dry and leave open to air. To help prevent infections, be sure to keep the cath site clean and dry. No lotions, creams, powders, ointments, etc. in the cath site for approximately 1 week.      Do not take a tub bath, get in a hot tub or swimming pool for approximately 5 days or until the cath site is completely healed.  No strenuous activity or heavy lifting over 10 lbs. for 7 days.  After your cath, some bruising or discomfort is common during the healing process. Tylenol, 1-2 tablets every 6 hours as needed, is recommended if you experience any discomfort. If you experience any signs or symptoms of infection such as fever, chills, or poorly healing incision, persistent tenderness or swelling in the groin, redness and/or warmth to the touch, numbness, significant tingling or pain at the groin site or affected extremity, rash, drainage from the cath site, or if the leg feels tight or swollen, call your physician right away.  If bleeding at the cath site occurs, take a clean gauze pad and apply direct pressure to the groin just above the puncture site. Call 911 immediately, and continue to apply direct pressure until an ambulance gets to your location. Information obtained by :  I understand that if any problems occur once I am at home I am to contact my physician. I understand and acknowledge receipt of the instructions indicated above. R.N.'s Signature                                                                  Date/Time                                                                                                                                              Patient or Representative Signature                                                          Date/Time      Kay Reece MD      Where can you learn more? Go to [email or web address]          [address] (173) [address] [address]    [email or web address]

## 2019-02-01 NOTE — PROGRESS NOTES
Chart review. Unsuccessful attempt of PCI to LAD per Cardiology note. Possible CABG at a later date. Anticipate discharge today. H2H - to be provided by [address] Patient will need assistance with meds at discharge. CM will need scripts to get pricing and process. Boris Gonzalez RN CM Ext [phone]

## 2019-02-01 NOTE — PROGRESS NOTES
Attempted to go over use and side effects of all of his new meds, but he said he was in a hurry and had to leave.

## 2019-02-01 NOTE — PROGRESS NOTES
Bedside and Verbal shift change report given to Li Wheeler RN (oncoming nurse) by Anaya Silver RN (offgoing nurse). Report included the following information SBAR, Kardex, Procedure Summary, Intake/Output, MAR, Accordion, Recent Results and Cardiac Rhythm NSR.

## 2019-02-04 ENCOUNTER — HOME CARE VISIT (OUTPATIENT)
Dept: HOME HEALTH SERVICES | Facility: HOME HEALTH | Age: 44
End: 2019-02-04

## 2019-02-07 ENCOUNTER — HOME CARE VISIT (OUTPATIENT)
Dept: HOME HEALTH SERVICES | Facility: HOME HEALTH | Age: 44
End: 2019-02-07

## 2019-02-07 LAB
ACT BLD: 208 SECS (ref 79–138)
ACT BLD: 274 SECS (ref 79–138)

## 2019-02-12 ENCOUNTER — OFFICE VISIT (OUTPATIENT)
Dept: FAMILY MEDICINE CLINIC | Age: 44
End: 2019-02-12

## 2019-02-12 VITALS
RESPIRATION RATE: 20 BRPM | SYSTOLIC BLOOD PRESSURE: 170 MMHG | WEIGHT: 287 LBS | HEART RATE: 71 BPM | OXYGEN SATURATION: 98 % | TEMPERATURE: 98.5 F | HEIGHT: 74 IN | BODY MASS INDEX: 36.83 KG/M2 | DIASTOLIC BLOOD PRESSURE: 100 MMHG

## 2019-02-12 DIAGNOSIS — E55.9 VITAMIN D DEFICIENCY: ICD-10-CM

## 2019-02-12 DIAGNOSIS — E78.5 DYSLIPIDEMIA (HIGH LDL; LOW HDL): ICD-10-CM

## 2019-02-12 DIAGNOSIS — Z00.00 ENCOUNTER FOR MEDICARE ANNUAL WELLNESS EXAM: ICD-10-CM

## 2019-02-12 DIAGNOSIS — Z13.29 SCREENING FOR THYROID DISORDER: ICD-10-CM

## 2019-02-12 DIAGNOSIS — I10 HYPERTENSION GOAL BP (BLOOD PRESSURE) < 130/80: Primary | ICD-10-CM

## 2019-02-12 DIAGNOSIS — D52.0 ANEMIA, MACROCYTIC, NUTRITIONAL: ICD-10-CM

## 2019-02-12 DIAGNOSIS — R73.02 IGT (IMPAIRED GLUCOSE TOLERANCE): ICD-10-CM

## 2019-02-12 DIAGNOSIS — R35.0 FREQUENCY OF URINATION: ICD-10-CM

## 2019-02-12 PROBLEM — E66.01 SEVERE OBESITY (HCC): Status: ACTIVE | Noted: 2019-02-12

## 2019-02-12 NOTE — PATIENT INSTRUCTIONS
Lisinopril 20 m tabs togather in morning  Take all your other medications as before       Body Mass Index: Care Instructions  Your Care Instructions    Body mass index (BMI) can help you see if your weight is raising your risk for health problems. It uses a formula to compare how much you weigh with how tall you are. · A BMI lower than 18.5 is considered underweight. · A BMI between 18.5 and 24.9 is considered healthy. · A BMI between 25 and 29.9 is considered overweight. A BMI of 30 or higher is considered obese. If your BMI is in the normal range, it means that you have a lower risk for weight-related health problems. If your BMI is in the overweight or obese range, you may be at increased risk for weight-related health problems, such as high blood pressure, heart disease, stroke, arthritis or joint pain, and diabetes. If your BMI is in the underweight range, you may be at increased risk for health problems such as fatigue, lower protection (immunity) against illness, muscle loss, bone loss, hair loss, and hormone problems. BMI is just one measure of your risk for weight-related health problems. You may be at higher risk for health problems if you are not active, you eat an unhealthy diet, or you drink too much alcohol or use tobacco products. Follow-up care is a key part of your treatment and safety. Be sure to make and go to all appointments, and call your doctor if you are having problems. It's also a good idea to know your test results and keep a list of the medicines you take. How can you care for yourself at home? · Practice healthy eating habits. This includes eating plenty of fruits, vegetables, whole grains, lean protein, and low-fat dairy. · If your doctor recommends it, get more exercise. Walking is a good choice. Bit by bit, increase the amount you walk every day. Try for at least 30 minutes on most days of the week. · Do not smoke. Smoking can increase your risk for health problems.  If you need help quitting, talk to your doctor about stop-smoking programs and medicines. These can increase your chances of quitting for good. · Limit alcohol to 2 drinks a day for men and 1 drink a day for women. Too much alcohol can cause health problems. If you have a BMI higher than 25  · Your doctor may do other tests to check your risk for weight-related health problems. This may include measuring the distance around your waist. A waist measurement of more than 40 inches in men or 35 inches in women can increase the risk of weight-related health problems. · Talk with your doctor about steps you can take to stay healthy or improve your health. You may need to make lifestyle changes to lose weight and stay healthy, such as changing your diet and getting regular exercise. If you have a BMI lower than 18.5  · Your doctor may do other tests to check your risk for health problems. · Talk with your doctor about steps you can take to stay healthy or improve your health. You may need to make lifestyle changes to gain or maintain weight and stay healthy, such as getting more healthy foods in your diet and doing exercises to build muscle. Where can you learn more? Go to http://afshin-tre.info/. Enter S176 in the search box to learn more about \"Body Mass Index: Care Instructions. \"  Current as of: October 13, 2016  Content Version: 11.4  © 6637-0946 [employer]. Care instructions adapted under license by [employer] (which disclaims liability or warranty for this information). If you have questions about a medical condition or this instruction, always ask your healthcare professional. [place] [address] any warranty or liability for your use of this information.

## 2019-02-12 NOTE — DISCHARGE SUMMARY
[address] SUMMARY    Name:  Brent Macario  MR#:  [id number]  :  1975  ACCOUNT #:  [de-identified]  ADMIT DATE:  2019  DISCHARGE DATE:  2019    DISCHARGE DIAGNOSES:  1.  Status post inferior wall myocardial infarction with percutaneous transluminal  coronary angioplasty and stenting of the ramus intermedius vessel with a drug-eluting  stent on the day of admission. 2.  Severe three-vessel coronary artery disease with moderate-to-severe left  ventricular dysfunction. 3.  Hypertension and hypertensive heart disease. 4.  Tobacco abuse. 5.  Dyslipidemia. 6.  Status post attempted percutaneous transluminal coronary angioplasty and stenting  of the left anterior descending on , which was not successful. 7.  Family history of coronary artery disease. 8.  History of tobacco abuse. 9.  History of medication noncompliance. HISTORY OF PRESENT ILLNESS:  Please see the separately dictated note by Dr. Nancy Logan  on the day of admission. HOSPITAL COURSE:  The patient was admitted after he presented to the emergency room  with an acute ST elevation myocardial infarction. He was taken to the Cardiac  Catheterization Lab, where PTCA and stenting of the ramus intermedius vessel was  performed by Dr. Shay Cyr. The patient was found to have severe three-vessel  coronary artery disease with an EF of 25 to 30%. The LAD was occluded. The patient  was severely hypertensive on initial presentation. Post-procedure, the patient was admitted to the ICU. He received potassium  replacement and medications were adjusted. He had no further chest discomfort and  there was slow improvement of the patient's blood pressure with the addition of a  beta blocker, lisinopril, clonidine and spironolactone. The patient had no  significant arrhythmias or heart failure. He was seen by cardiac rehab and received  smoking cessation counseling. He was started on a statin. He did well and was  slowly weaned off of IV nitroglycerin, while his oral medications were up titrated. It was decided to perform stenting of the LAD which was totally occluded on the  initial catheterization. Prior to this, the patient had a nuclear stress test.  This  showed global hypokinesis with an EF of 27%, left ventricular dilatation and a fixed  defect involving the inferior and lateral walls. There was no defect in the anterior  distribution. The patient underwent attempted PTCA and stenting of the LAD on 01/31. Unfortunately the lesion could not be crossed and this was unsuccessful. The patient  tolerated the procedure well. The following day, he was felt to have reached maximum  hospital benefit and was discharged to home with close outpatient followup arranged. DISCHARGE MEDICATIONS:  Aspirin 81 mg daily, Lipitor 40 mg daily, clonidine 0.3 mg  b.i.d., lisinopril 20 mg b.i.d., nitroglycerin sublingual p.r.n., spironolactone 25  mg daily, Plavix 75 mg daily, carvedilol 6.25 mg b.i.d. FOLLOWUP:  The patient was to follow up with Dr. Gale Cheek approximately one week post  discharge.         Jesus Murphy MD      [id number]  D:  02/11/2019 18:07  T:  02/12/2019 7:47  JOB #:  [id number]  CC:  Effie Ordonez MD

## 2019-02-12 NOTE — PROGRESS NOTES
Chief Complaint   Patient presents with    New Patient     establish care     HPI:  Stephan Zhong is a 37 y.o. AA male with h/o CAD, s/p inferior wall myocardial infarction with percutaneous transluminal coronary angioplasty and stenting of the ramus intermedius vessel with a drug-eluting Stent 0131/2019,  Severe three-vessel coronary artery disease with moderate-to-severe left ventricular dysfunction,  Hypertension and hypertensive heart disease, Tobacco abuse,  Dyslipidemia. Presents to establish care. .  Review of Systems  Constitutional: negative for fevers/chills  Eyes:   negative for visual disturbance  Respiratory:  negative for cough, dyspnea,wheezing  CV:   negative for chest pain, palpitations, lower extremity edema  GI:   negative for nausea, vomiting, diarrhea, abdominal pain  Endo:               negative for polyuria,polydipsia,polyphagia,heat intolerance  Genitourinary: negative for frequency, dysuria   Integument:  negative for rash and pruritus  Musculoskel: positive for right groin pain  Neurological:  negative for headaches, dizziness  Behavl/Psych: negative for feelings of anxiety, depression, mood changes    Past Medical History:   Diagnosis Date    Congestive heart failure ([facility] [address]     Hypertension      Past Surgical History:   Procedure Laterality Date    HX APPENDECTOMY      HX HEENT      eye surgery     Social History     Socioeconomic History    Marital status: UNKNOWN     Spouse name: Not on file    Number of children: Not on file    Years of education: Not on file    Highest education level: Not on file   Tobacco Use    Smoking status: Former Smoker    Smokeless tobacco: Never Used     No family history on file. Current Outpatient Medications   Medication Sig Dispense Refill    aspirin 81 mg chewable tablet Take 1 Tab by mouth daily. 30 Tab 6    atorvastatin (LIPITOR) 40 mg tablet Take 1 Tab by mouth nightly.  30 Tab 6    cloNIDine HCl (CATAPRES) 0.3 mg tablet Take 1 Tab by mouth two (2) times a day. 60 Tab 6    lisinopril (PRINIVIL, ZESTRIL) 20 mg tablet Take 1 Tab by mouth two (2) times a day. 30 Tab 6    nitroglycerin (NITROSTAT) 0.4 mg SL tablet 1 Tab by SubLINGual route every five (5) minutes as needed for Chest Pain. Up to 3 doses. 1 Bottle 6    spironolactone (ALDACTONE) 25 mg tablet Take 1 Tab by mouth daily. 30 Tab 6    clopidogrel (PLAVIX) 75 mg tab Take 1 Tab by mouth daily. 30 Tab 6    carvedilol (COREG) 6.25 mg tablet Take 1 Tab by mouth two (2) times daily (with meals). 60 Tab 6    lisinopril-hydroCHLOROthiazide (PRINZIDE, ZESTORETIC) 10-12.5 mg per tablet Take  by mouth daily.        No Known Allergies    Objective:  Visit Vitals  BP (!) 170/100   Pulse 71   Temp 98.5 °F (36.9 °C) (Oral)   Resp 20   Ht 6' 2\" (1.88 m)   Wt 287 lb (130.2 kg)   SpO2 98%   BMI 36.85 kg/m²     Physical Exam:   General appearance - alert, well appearing in no distress  Mental status - alert, oriented to person, place, and time  EYE-PERRL, EOMI, Anicteric  ENT-ENT exam normal, no neck nodes or sinus tenderness  Mouth - mucous membranes moist, pharynx normal without lesions  Neck - supple, no significant adenopathy   Chest - clear to auscultation, no wheezes, rales or rhonchi   Heart - normal rate, regular rhythm, normal blood pressure  Abdomen - soft, nontender, nondistended, no organomegaly  Ext-peripheral pulses normal, no pedal edema  Neuro -alert, oriented, normal speech, no focal findings     Results for orders placed or performed during the hospital encounter of 54/33/05   METABOLIC PANEL, BASIC   Result Value Ref Range    Sodium 138 136 - 145 mmol/L    Potassium 3.2 (L) 3.5 - 5.1 mmol/L    Chloride 104 97 - 108 mmol/L    CO2 28 21 - 32 mmol/L    Anion gap 6 5 - 15 mmol/L    Glucose 92 65 - 100 mg/dL    BUN 11 6 - 20 MG/DL    Creatinine 1.14 0.70 - 1.30 MG/DL    BUN/Creatinine ratio 10 (L) 12 - 20      GFR est AA >60 >60 ml/min/1.73m2    GFR est non-AA >60 >60 ml/min/1.73m2 Calcium 7.8 (L) 8.5 - 10.1 MG/DL   LIPID PANEL   Result Value Ref Range    LIPID PROFILE          Cholesterol, total 119 <200 MG/DL    Triglyceride 91 <150 MG/DL    HDL Cholesterol 36 MG/DL    LDL, calculated 64.8 0 - 100 MG/DL    VLDL, calculated 18.2 MG/DL    CHOL/HDL Ratio 3.3 0 - 5.0     CBC W/O DIFF   Result Value Ref Range    WBC 8.5 4.1 - 11.1 K/uL    RBC 4.25 4. 10 - 5.70 M/uL    HGB 10.8 (L) 12.1 - 17.0 g/dL    HCT 34.3 (L) 36.6 - 50.3 %    MCV 80.7 80.0 - 99.0 FL    MCH 25.4 (L) 26.0 - 34.0 PG    MCHC 31.5 30.0 - 36.5 g/dL    RDW 13.9 11.5 - 14.5 %    PLATELET 066 297 - 891 K/uL    MPV 10.4 8.9 - 12.9 FL    NRBC 0.0 0  WBC    ABSOLUTE NRBC 0.00 0.00 - 6.96 K/uL   METABOLIC PANEL, BASIC   Result Value Ref Range    Sodium 139 136 - 145 mmol/L    Potassium 3.4 (L) 3.5 - 5.1 mmol/L    Chloride 106 97 - 108 mmol/L    CO2 25 21 - 32 mmol/L    Anion gap 8 5 - 15 mmol/L    Glucose 92 65 - 100 mg/dL    BUN 15 6 - 20 MG/DL    Creatinine 0.93 0.70 - 1.30 MG/DL    BUN/Creatinine ratio 16 12 - 20      GFR est AA >60 >60 ml/min/1.73m2    GFR est non-AA >60 >60 ml/min/1.73m2    Calcium 7.9 (L) 8.5 - 68.3 MG/DL   METABOLIC PANEL, BASIC   Result Value Ref Range    Sodium 137 136 - 145 mmol/L    Potassium 4.2 3.5 - 5.1 mmol/L    Chloride 106 97 - 108 mmol/L    CO2 24 21 - 32 mmol/L    Anion gap 7 5 - 15 mmol/L    Glucose 108 (H) 65 - 100 mg/dL    BUN 15 6 - 20 MG/DL    Creatinine 1.13 0.70 - 1.30 MG/DL    BUN/Creatinine ratio 13 12 - 20      GFR est AA >60 >60 ml/min/1.73m2    GFR est non-AA >60 >60 ml/min/1.73m2    Calcium 8.4 (L) 8.5 - 10.1 MG/DL   POC ACTIVATED CLOTTING TIME   Result Value Ref Range    Activated Clotting Time (POC) 274 (H) 79 - 138 SECS   POC ACTIVATED CLOTTING TIME   Result Value Ref Range    Activated Clotting Time (POC) 208 (H) 79 - 138 SECS   EKG, 12 LEAD, SUBSEQUENT   Result Value Ref Range    Ventricular Rate 87 BPM    Atrial Rate 87 BPM    P-R Interval 152 ms    QRS Duration 98 ms    Q-T Interval 434 ms    QTC Calculation (Bezet) 522 ms    Calculated P Axis 48 degrees    Calculated R Axis 7 degrees    Calculated T Axis -67 degrees    Diagnosis       Normal sinus rhythm  Possible Left atrial enlargement  Inferior infarct , possibly acute  Cannot rule out Anterior infarct , age undetermined  Non-specific ST & T wave changes  Prolonged QT  Confirmed by Nathan Luna ([id number]) on 1/27/2019 8:45:36 PM     EKG, 12 LEAD, INITIAL   Result Value Ref Range    Ventricular Rate 89 BPM    Atrial Rate 89 BPM    P-R Interval 172 ms    QRS Duration 94 ms    Q-T Interval 418 ms    QTC Calculation (Bezet) 508 ms    Calculated P Axis 46 degrees    Calculated R Axis -14 degrees    Calculated T Axis 94 degrees    Diagnosis       Normal sinus rhythm  Inferior infarct , possibly acute  Prolonged QT  ** ACUTE MI **    Confirmed by Nathan Luna [id number]) on 1/27/2019 9:00:41 PM     NUCLEAR CARDIAC STRESS TEST   Result Value Ref Range    Target  bpm    Exercise duration time 00:00:22     Stress Base Systolic  mmHg    Stress Base Diastolic  mmHg    Post peak  BPM    Baseline HR 80 BPM    Estimated workload 1.0 METS    Percent HR 66 %    Stress Rate Pressure Product 16,200 BPM*mmHg   ECHO ADULT COMPLETE   Result Value Ref Range    Aortic Valve Systolic Peak Velocity 24.57 cm/s    AoV VTI 18.53 cm    Aortic Valve Area by Continuity of Peak Velocity 3.4 cm2    Aortic Valve Area by Continuity of VTI 3.9 cm2    AoV PG 3.8 mmHg    LVIDd 5.81 4.2 - 5.9 cm    LVPWd 1.09 (A) 0.6 - 1.0 cm    LVIDs 4.48 cm    IVSd 1.50 (A) 0.6 - 1.0 cm    LVOT d 2.17 cm    LVOT Peak Velocity 89.19 cm/s    LVOT Peak Gradient 3.2 mmHg    LVOT VTI 19.54 cm    MVA (PHT) 3.5 cm2    MV A Chuck 53.85 cm/s    MV E Chuck 0.77 cm/s    MV E/A 0.01     RVIDd 2.66 cm    Aortic Valve Systolic Mean Gradient 2.4 mmHg    LV Mass .4 (A) 88 - 224 g    LV Mass AL Index 156.2 (A) 49 - 115 g/m2    RVSP 15.9 mmHg    Est. RA Pressure 10.0 mmHg Mitral Regurgitant Peak Velocity 415.86 cm/s    Mitral Valve E Wave Deceleration Time 218.4 ms    Mitral Valve Pressure Half-time 63.3 ms    Triscuspid Valve Regurgitation Peak Gradient 5.9 mmHg    Pulmonic Valve Max Velocity 62.68 cm/s    LVOT SV 72.5 ml    TR Max Velocity 121.02 cm/s    PASP 15.9 mmHg    MR Peak Gradient 69.2 mmHg    Ao Root D 3.47 cm    SERA/BSA Pk Chuck 1.3 cm2/m2    SERA/BSA VTI 1.5 cm2/m2    PV peak gradient 1.6 mmHg     Assessment/Plan:  Diagnoses and all orders for this visit:    Hypertension goal BP (blood pressure) < 253/24  -     METABOLIC PANEL, COMPREHENSIVE    Dyslipidemia (high LDL; low HDL)  -     LIPID PANEL    Vitamin D deficiency  -     VITAMIN D, 25 HYDROXY    Frequency of urination  -     URINALYSIS W/ RFLX MICROSCOPIC    IGT (impaired glucose tolerance)  -     HEMOGLOBIN A1C WITH EAG    Screening for thyroid disorder  -     TSH 3RD GENERATION    Anemia, macrocytic, nutritional  -     CBC WITH AUTOMATED DIFF    Encounter for Medicare annual wellness exam  -     METABOLIC PANEL, COMPREHENSIVE  -     CBC WITH AUTOMATED DIFF      Patient Instructions   Lisinopril 20 m tabs togather in morning  Take all your other medications as before       Body Mass Index: Care Instructions  Your Care Instructions    Body mass index (BMI) can help you see if your weight is raising your risk for health problems. It uses a formula to compare how much you weigh with how tall you are. · A BMI lower than 18.5 is considered underweight. · A BMI between 18.5 and 24.9 is considered healthy. · A BMI between 25 and 29.9 is considered overweight. A BMI of 30 or higher is considered obese. If your BMI is in the normal range, it means that you have a lower risk for weight-related health problems. If your BMI is in the overweight or obese range, you may be at increased risk for weight-related health problems, such as high blood pressure, heart disease, stroke, arthritis or joint pain, and diabetes.  If your BMI is in the underweight range, you may be at increased risk for health problems such as fatigue, lower protection (immunity) against illness, muscle loss, bone loss, hair loss, and hormone problems. BMI is just one measure of your risk for weight-related health problems. You may be at higher risk for health problems if you are not active, you eat an unhealthy diet, or you drink too much alcohol or use tobacco products. Follow-up care is a key part of your treatment and safety. Be sure to make and go to all appointments, and call your doctor if you are having problems. It's also a good idea to know your test results and keep a list of the medicines you take. How can you care for yourself at home? · Practice healthy eating habits. This includes eating plenty of fruits, vegetables, whole grains, lean protein, and low-fat dairy. · If your doctor recommends it, get more exercise. Walking is a good choice. Bit by bit, increase the amount you walk every day. Try for at least 30 minutes on most days of the week. · Do not smoke. Smoking can increase your risk for health problems. If you need help quitting, talk to your doctor about stop-smoking programs and medicines. These can increase your chances of quitting for good. · Limit alcohol to 2 drinks a day for men and 1 drink a day for women. Too much alcohol can cause health problems. If you have a BMI higher than 25  · Your doctor may do other tests to check your risk for weight-related health problems. This may include measuring the distance around your waist. A waist measurement of more than 40 inches in men or 35 inches in women can increase the risk of weight-related health problems. · Talk with your doctor about steps you can take to stay healthy or improve your health. You may need to make lifestyle changes to lose weight and stay healthy, such as changing your diet and getting regular exercise.   If you have a BMI lower than 18.5  · Your doctor may do other tests to check your risk for health problems. · Talk with your doctor about steps you can take to stay healthy or improve your health. You may need to make lifestyle changes to gain or maintain weight and stay healthy, such as getting more healthy foods in your diet and doing exercises to build muscle. Where can you learn more? Go to http://afshin-tre.info/. Enter S176 in the search box to learn more about \"Body Mass Index: Care Instructions. \"  Current as of: October 13, 2016  Content Version: 11.4  © 5516-9504 [employer]. Care instructions adapted under license by [employer] (which disclaims liability or warranty for this information). If you have questions about a medical condition or this instruction, always ask your healthcare professional. [address] any warranty or liability for your use of this information. Follow-up Disposition:  Return 2 weeks, for f/u results.

## 2019-02-16 LAB
STRESS BASELINE HR: 80 BPM
STRESS ESTIMATED WORKLOAD: 1 METS
STRESS EXERCISE DUR MIN: NORMAL
STRESS PEAK DIAS BP: 107 MMHG
STRESS PEAK SYS BP: 162 MMHG
STRESS PERCENT HR ACHIEVED: 66 %
STRESS POST PEAK HR: 100 BPM
STRESS RATE PRESSURE PRODUCT: NORMAL BPM*MMHG
STRESS TARGET HR: 150 BPM

## 2019-03-12 ENCOUNTER — TELEPHONE (OUTPATIENT)
Dept: CARDIAC REHAB | Age: 44
End: 2019-03-12

## 2019-03-12 NOTE — TELEPHONE ENCOUNTER
Pt was a No Show for his cardiac rehab intake appointment this morning. Called patient's phone number but unable to leave message as \"mailbox\" is full.

## 2019-07-23 ENCOUNTER — TELEPHONE (OUTPATIENT)
Dept: CARDIAC REHAB | Age: 44
End: 2019-07-23

## 2019-07-23 NOTE — TELEPHONE ENCOUNTER
Multiple attempts were made to contact patient to reschedule Cardiac Rehab intake appointment. Final attempt made today. Phone goes to fax line. Patient will be eligible for program in the future with new referral if interested.

## (undated) DEVICE — Device: Brand: PROWATER

## (undated) DEVICE — ANGIO-SEAL VIP VASCULAR CLOSURE DEVICE: Brand: ANGIO-SEAL

## (undated) DEVICE — GUIDEWIRE WITH ICE™ HYDROPHILIC COATING: Brand: CHOICE™ PT

## (undated) DEVICE — CATH GUID COR EB40 6FR 100CM -- LAUNCHER

## (undated) DEVICE — ADULT SPO2 SENSOR: Brand: NELLCOR

## (undated) DEVICE — PINNACLE INTRODUCER SHEATH: Brand: PINNACLE

## (undated) DEVICE — NDL PERC VASC ACC 18GX2.75I --

## (undated) DEVICE — KIT ANGIOGRAPHY CUST MRMC

## (undated) DEVICE — CUSTOM KT PTCA INFL DEV K05 00053H

## (undated) DEVICE — Device: Brand: CORSAIR PRO

## (undated) DEVICE — CATH GUID COR EB35 6FR 100CM -- LAUNCHER

## (undated) DEVICE — Device: Brand: FIELDER XT

## (undated) DEVICE — DESTINATION RENAL GUIDING SHEATH: Brand: DESTINATION

## (undated) DEVICE — GUIDEWIRE VASC L145CM 0.035IN J TIP L3MM PTFE FIX COR NAMIC

## (undated) DEVICE — PACK PROCEDURE SURG HRT CATH

## (undated) DEVICE — HI-TORQUE CROSS-IT 100XT GUIDE WIRE W/HYDROCOAT HYDROPHILIC COATING .014 STRAIGHT TIP  3.0 CM X 300 CM: Brand: CROSS-IT

## (undated) DEVICE — HI-TORQUE PILOT 200 GUIDE WIRE .014 STRAIGHT TIP 3.0 CM X 300 CM: Brand: HI-TORQUE PILOT